# Patient Record
Sex: MALE | Race: WHITE | HISPANIC OR LATINO | Employment: FULL TIME | URBAN - METROPOLITAN AREA
[De-identification: names, ages, dates, MRNs, and addresses within clinical notes are randomized per-mention and may not be internally consistent; named-entity substitution may affect disease eponyms.]

---

## 2019-02-20 ENCOUNTER — HOSPITAL ENCOUNTER (EMERGENCY)
Facility: HOSPITAL | Age: 24
Discharge: HOME | End: 2019-02-20
Attending: EMERGENCY MEDICINE
Payer: MEDICAID

## 2019-02-20 VITALS
WEIGHT: 215 LBS | HEIGHT: 71 IN | SYSTOLIC BLOOD PRESSURE: 118 MMHG | RESPIRATION RATE: 17 BRPM | TEMPERATURE: 99 F | OXYGEN SATURATION: 100 % | BODY MASS INDEX: 30.1 KG/M2 | HEART RATE: 83 BPM | DIASTOLIC BLOOD PRESSURE: 67 MMHG

## 2019-02-20 DIAGNOSIS — J06.9 VIRAL URI WITH COUGH: Primary | ICD-10-CM

## 2019-02-20 LAB
INFLUENZA A, MOLECULAR: NEGATIVE
INFLUENZA B, MOLECULAR: NEGATIVE
SPECIMEN SOURCE: NORMAL

## 2019-02-20 PROCEDURE — 87502 INFLUENZA DNA AMP PROBE: CPT

## 2019-02-20 PROCEDURE — 25000003 PHARM REV CODE 250: Performed by: NURSE PRACTITIONER

## 2019-02-20 PROCEDURE — 99284 EMERGENCY DEPT VISIT MOD MDM: CPT

## 2019-02-20 RX ORDER — IBUPROFEN 600 MG/1
600 TABLET ORAL
Status: COMPLETED | OUTPATIENT
Start: 2019-02-20 | End: 2019-02-20

## 2019-02-20 RX ORDER — CETIRIZINE HYDROCHLORIDE 10 MG/1
10 TABLET ORAL DAILY
Qty: 30 TABLET | Refills: 0 | COMMUNITY
Start: 2019-02-20 | End: 2019-05-01

## 2019-02-20 RX ORDER — FLUTICASONE PROPIONATE 50 MCG
1 SPRAY, SUSPENSION (ML) NASAL 2 TIMES DAILY PRN
Qty: 15 G | Refills: 0 | Status: SHIPPED | OUTPATIENT
Start: 2019-02-20 | End: 2020-03-02

## 2019-02-20 RX ADMIN — IBUPROFEN 600 MG: 600 TABLET, FILM COATED ORAL at 08:02

## 2019-02-21 NOTE — ED PROVIDER NOTES
Encounter Date: 2/20/2019       History     Chief Complaint   Patient presents with    Generalized Body Aches     body pain, chills, cough, HA, congestion, SOB.      24-year-old male with presents with body aches and chills that began last night.  Patient is also complaining of congestion.  Patient took Mucinex for his congestion but did not take any Motrin or Tylenol for his body aches or chills.      The history is provided by the patient.     Review of patient's allergies indicates:  No Known Allergies  History reviewed. No pertinent past medical history.  History reviewed. No pertinent surgical history.  History reviewed. No pertinent family history.  Social History     Tobacco Use    Smoking status: Never Smoker   Substance Use Topics    Alcohol use: No     Frequency: Never     Comment: social    Drug use: No     Review of Systems   Constitutional: Positive for chills. Negative for fever.   HENT: Positive for congestion. Negative for sore throat.    Respiratory: Negative for cough and shortness of breath.    Cardiovascular: Negative for chest pain.   Gastrointestinal: Negative for nausea.   Genitourinary: Negative for dysuria.   Musculoskeletal: Positive for myalgias. Negative for back pain.   Skin: Negative for rash.   Neurological: Negative for weakness.   Hematological: Does not bruise/bleed easily.   All other systems reviewed and are negative.    Physical Exam     Initial Vitals [02/20/19 1846]   BP Pulse Resp Temp SpO2   (!) 184/79 108 17 99.6 °F (37.6 °C) 98 %      MAP       --         Physical Exam    Nursing note and vitals reviewed.  Constitutional: He appears well-developed and well-nourished. He is cooperative.  Non-toxic appearance.   HENT:   Head: Normocephalic and atraumatic.   Right Ear: Hearing, external ear and ear canal normal. Tympanic membrane is injected. Tympanic membrane is not erythematous and not retracted. A middle ear effusion is present.   Left Ear: Hearing, external ear and ear  canal normal. Tympanic membrane is injected. Tympanic membrane is not erythematous, not retracted and not bulging. A middle ear effusion is present.   Nose: Mucosal edema present. Right sinus exhibits no maxillary sinus tenderness and no frontal sinus tenderness. Left sinus exhibits no maxillary sinus tenderness and no frontal sinus tenderness.   Mouth/Throat: Uvula is midline, oropharynx is clear and moist and mucous membranes are normal.   Cobblestone appearance to posterior oropharynx and swollen turbinates to bilateral nares   Eyes: Conjunctivae and EOM are normal. Pupils are equal, round, and reactive to light.   Neck: Normal range of motion.   Cardiovascular: Normal rate, regular rhythm, S1 normal, S2 normal, normal heart sounds, intact distal pulses and normal pulses. Exam reveals no gallop and no friction rub.    No murmur heard.  Pulmonary/Chest: Breath sounds normal. No stridor. No respiratory distress. He has no wheezes. He has no rhonchi. He has no rales. He exhibits no tenderness.   Abdominal: Soft. Normal appearance and bowel sounds are normal. He exhibits no distension and no mass. There is no tenderness. There is no rebound and no guarding.   Musculoskeletal: Normal range of motion.   Lymphadenopathy:     He has no cervical adenopathy.   Neurological: He is alert.       ED Course   Procedures  Labs Reviewed   INFLUENZA A & B BY MOLECULAR           Medical Decision Making:   Initial Assessment:   24-year-old male with presents with body aches and chills that began last night.  Patient is also complaining of congestion.  Patient took Mucinex for his congestion but did not take any Motrin or Tylenol for his body aches or chills. Influenza ordered    Differential Diagnosis:   Influenza, viral URI, sinusitis  Clinical Tests:   Lab Tests: Ordered and Reviewed       <> Summary of Lab: Influenza negative  ED Management:  Patient examined and noted to have an exam consistent with a viral URI.  Influenza  negative.  Patient advised to use Flonase and Zyrtec at discharge. Patient given strict return precautions and voiced understanding of all discharge instructions.  Patient stable discharge.                   ED Course as of Feb 21 0048 Wed Feb 20, 2019 1901 BP: (!) 184/79 [AT]   1901 Temp: 99.6 °F (37.6 °C) [AT]   1901 Temp src: Oral [AT]   1901 Pulse: 108 [AT]   1901 Resp: 17 [AT]   1901 SpO2: 98 % [AT]   2012 Influenza A, Molecular: Negative [AT]   2012 Influenza B, Molecular: Negative [AT]      ED Course User Index  [AT] WILL Newman     Clinical Impression:       ICD-10-CM ICD-9-CM   1. Viral URI with cough J06.9 465.9    B97.89                                 WILL Newman  02/21/19 0051

## 2019-02-21 NOTE — ED NOTES
"Patient states he woke up this morning " with a headache, pressure behind my eyes, nose stopped up, and my body just hurts." Patient states while at work last he was also having episodes of being really hot and then really cold. Patient denies n/v but states he has been having intermittent episodes of diarrhea for two days.   "

## 2019-05-01 ENCOUNTER — HOSPITAL ENCOUNTER (EMERGENCY)
Facility: HOSPITAL | Age: 24
Discharge: HOME | End: 2019-05-01
Attending: EMERGENCY MEDICINE
Payer: MEDICAID

## 2019-05-01 VITALS
TEMPERATURE: 98 F | RESPIRATION RATE: 18 BRPM | WEIGHT: 217 LBS | OXYGEN SATURATION: 100 % | HEART RATE: 73 BPM | HEIGHT: 71 IN | SYSTOLIC BLOOD PRESSURE: 134 MMHG | DIASTOLIC BLOOD PRESSURE: 78 MMHG | BODY MASS INDEX: 30.38 KG/M2

## 2019-05-01 DIAGNOSIS — R05.9 COUGH: ICD-10-CM

## 2019-05-01 DIAGNOSIS — J40 BRONCHITIS: Primary | ICD-10-CM

## 2019-05-01 PROCEDURE — 99283 EMERGENCY DEPT VISIT LOW MDM: CPT | Mod: 25

## 2019-05-01 PROCEDURE — 25000003 PHARM REV CODE 250: Performed by: NURSE PRACTITIONER

## 2019-05-01 RX ORDER — IBUPROFEN 600 MG/1
600 TABLET ORAL
Status: COMPLETED | OUTPATIENT
Start: 2019-05-01 | End: 2019-05-01

## 2019-05-01 RX ORDER — BENZONATATE 100 MG/1
100 CAPSULE ORAL 3 TIMES DAILY PRN
Qty: 20 CAPSULE | Refills: 0 | Status: SHIPPED | OUTPATIENT
Start: 2019-05-01 | End: 2019-05-11

## 2019-05-01 RX ORDER — CETIRIZINE HYDROCHLORIDE 10 MG/1
10 TABLET ORAL DAILY
Qty: 30 TABLET | Refills: 0 | Status: SHIPPED | OUTPATIENT
Start: 2019-05-01 | End: 2020-03-02

## 2019-05-01 RX ADMIN — IBUPROFEN 600 MG: 600 TABLET ORAL at 08:05

## 2019-05-02 NOTE — DISCHARGE INSTRUCTIONS
Increase your fluid intake.  Use tylenol and motrin as directed on the labeling to help with fever/discomfort.

## 2019-05-02 NOTE — ED PROVIDER NOTES
Encounter Date: 5/1/2019       History     Chief Complaint   Patient presents with    Cough     Pt. c/o sore throat and productive cough with chest congestion for two days.     24-year-old male presents the ED for sore throat and productive cough for 2 days.  He reports subjective fever.  He denies chest pain or shortness of breath.  He has tried Flonase for symptoms without improvement.  Nothing makes symptoms worse or better.  He reports that he previously smoked cigarettes, but quit over a month ago.  He denies history of asthma and pneumonia.  He has been tolerating oral fluids without difficulty.  No known sick contacts.  No other complaints at this time.    The history is provided by the patient.     Review of patient's allergies indicates:  No Known Allergies  History reviewed. No pertinent past medical history.  History reviewed. No pertinent surgical history.  History reviewed. No pertinent family history.  Social History     Tobacco Use    Smoking status: Never Smoker   Substance Use Topics    Alcohol use: No     Frequency: Never     Comment: social    Drug use: No     Review of Systems   Constitutional: Positive for activity change, fatigue and fever. Negative for chills.   HENT: Positive for congestion, postnasal drip, rhinorrhea, sore throat and voice change (Hoarse voice). Negative for ear pain and trouble swallowing.    Respiratory: Positive for cough. Negative for shortness of breath.    Cardiovascular: Negative for chest pain.   Gastrointestinal: Negative for abdominal pain, diarrhea, nausea and vomiting.   Musculoskeletal: Negative for back pain and neck pain.   Skin: Negative for rash.   Neurological: Negative for weakness and headaches.   Psychiatric/Behavioral: Negative for confusion.       Physical Exam     Initial Vitals [05/01/19 2030]   BP Pulse Resp Temp SpO2   (!) 158/78 73 18 98.3 °F (36.8 °C) 98 %      MAP       --         Physical Exam    Nursing note and vitals  reviewed.  Constitutional: He appears well-developed and well-nourished. He is active and cooperative. He is easily aroused.  Non-toxic appearance. He does not have a sickly appearance. He does not appear ill. No distress.   HENT:   Head: Normocephalic and atraumatic.   Right Ear: External ear normal.   Left Ear: External ear normal.   Nose: Mucosal edema and rhinorrhea present. No sinus tenderness.  No foreign bodies. Right sinus exhibits no maxillary sinus tenderness and no frontal sinus tenderness. Left sinus exhibits no maxillary sinus tenderness and no frontal sinus tenderness.   Mouth/Throat: Uvula is midline and mucous membranes are normal. Posterior oropharyngeal erythema present. No oropharyngeal exudate, posterior oropharyngeal edema or tonsillar abscesses.   PND.  No PTA.  Hoarse but not muffled voice.  Managing secretions without difficulty.     Eyes: Conjunctivae are normal.   Neck: Trachea normal, normal range of motion and full passive range of motion without pain. Neck supple. No stridor present.   Cardiovascular: Normal rate, regular rhythm and normal heart sounds.   Pulmonary/Chest: Effort normal and breath sounds normal. He has no wheezes.   Abdominal: Soft. Normal appearance and bowel sounds are normal. There is no tenderness.   Lymphadenopathy:     He has no cervical adenopathy.   Neurological: He is alert, oriented to person, place, and time and easily aroused. GCS eye subscore is 4. GCS verbal subscore is 5. GCS motor subscore is 6.   Skin: Skin is warm, dry and intact. No bruising and no rash noted. No erythema.   Psychiatric: He has a normal mood and affect. His speech is normal and behavior is normal. Judgment and thought content normal. Cognition and memory are normal.         ED Course   Procedures  Labs Reviewed - No data to display       Imaging Results          X-Ray Chest PA And Lateral (Final result)  Result time 05/01/19 21:01:39    Final result by Juliano Abdul MD (05/01/19  21:01:39)                 Impression:      1. No acute cardiopulmonary process.      Electronically signed by: Juliano Abdul MD  Date:    05/01/2019  Time:    21:01             Narrative:    EXAMINATION:  XR CHEST PA AND LATERAL    CLINICAL HISTORY:  Cough    TECHNIQUE:  PA and lateral views of the chest were performed.    COMPARISON:  None    FINDINGS:  The cardiomediastinal silhouette is not enlarged.  There is no pleural effusion.  The trachea is midline.  The lungs are symmetrically expanded bilaterally without evidence of acute parenchymal process. No large focal consolidation seen.  There is no pneumothorax.  The osseous structures are unremarkable.                                 Medical Decision Making:   Initial Assessment:   24-year-old male here for nasal congestion, sore throat, and cough for 2 days.  No chest pain or shortness of breath. The patient appears well, nontoxic.  Vitals stable. No respiratory distress.  Differential Diagnosis:   Bronchitis, URI, allergies, irritants, pulmonary edema, GERD, laryngitis, tracheitis, asthma, sinusitis, pneumonia, viral, COPD, medication side effect    Clinical Tests:   Radiological Study: Ordered and Reviewed  ED Management:  CXR, motrin  This is a 24 y.o. male  who presents to the ED today with cough, congestion, symptoms consistent with viral bronchitis.  No indication for abx at this time.  Encouraged increased fluid intake. [facility] workup today does not show any sign of pneumonia on CXR. Likely this is a viral course that will need to run its course.  The patient feels back to baseline and is ready to go home. There is no need for emergent intervention at this time. I will discharge home with symptom control.  Pt reports that he feels better after ED interventions.  The patient is comfortable with this plan. After taking into careful account the historical factors and physical exam findings of the patient's presentation today, in conjunction with the  empirical and objective data obtained on ED workup, no acute emergent medical condition has been identified. The patient appears to be low risk for an emergent medical condition and I feel it is safe and appropriate at this time for the patient to be discharged to follow-up as detailed in their discharge instructions for reevaluation and possible continued outpatient workup and management. Discharge and follow-up instructions discussed with the patient who expressed understanding and willingness to comply with my recommendations.  RX zyrtec and tessalon  This record utilized Dragon voice recognition system.    Past medical records reviewed.                         Clinical Impression:       ICD-10-CM ICD-9-CM   1. Bronchitis J40 490   2. Cough R05 786.2                                Vilma Boss, WILL  05/01/19 2107

## 2019-07-23 ENCOUNTER — HOSPITAL ENCOUNTER (EMERGENCY)
Facility: OTHER | Age: 24
Discharge: HOME | End: 2019-07-23
Attending: EMERGENCY MEDICINE
Payer: MEDICAID

## 2019-07-23 VITALS
HEIGHT: 71 IN | DIASTOLIC BLOOD PRESSURE: 79 MMHG | RESPIRATION RATE: 18 BRPM | OXYGEN SATURATION: 99 % | WEIGHT: 200 LBS | HEART RATE: 68 BPM | BODY MASS INDEX: 28 KG/M2 | TEMPERATURE: 98 F | SYSTOLIC BLOOD PRESSURE: 135 MMHG

## 2019-07-23 DIAGNOSIS — R10.13 EPIGASTRIC PAIN: Primary | ICD-10-CM

## 2019-07-23 DIAGNOSIS — R91.1 LUNG NODULE: ICD-10-CM

## 2019-07-23 LAB
ALBUMIN SERPL BCP-MCNC: 4.6 G/DL (ref 3.5–5.2)
ALP SERPL-CCNC: 65 U/L (ref 55–135)
ALT SERPL W/O P-5'-P-CCNC: 23 U/L (ref 10–44)
AMPHET+METHAMPHET UR QL: NEGATIVE
ANION GAP SERPL CALC-SCNC: 10 MMOL/L (ref 8–16)
AST SERPL-CCNC: 26 U/L (ref 10–40)
BARBITURATES UR QL SCN>200 NG/ML: NEGATIVE
BASOPHILS # BLD AUTO: 0.05 K/UL (ref 0–0.2)
BASOPHILS NFR BLD: 0.6 % (ref 0–1.9)
BENZODIAZ UR QL SCN>200 NG/ML: NEGATIVE
BILIRUB SERPL-MCNC: 0.3 MG/DL (ref 0.1–1)
BILIRUB UR QL STRIP: NEGATIVE
BUN SERPL-MCNC: 15 MG/DL (ref 6–20)
BZE UR QL SCN: NEGATIVE
CALCIUM SERPL-MCNC: 9.6 MG/DL (ref 8.7–10.5)
CANNABINOIDS UR QL SCN: NEGATIVE
CHLORIDE SERPL-SCNC: 104 MMOL/L (ref 95–110)
CLARITY UR: CLEAR
CO2 SERPL-SCNC: 23 MMOL/L (ref 23–29)
COLOR UR: YELLOW
CREAT SERPL-MCNC: 1 MG/DL (ref 0.5–1.4)
CREAT UR-MCNC: 179.4 MG/DL (ref 23–375)
DIFFERENTIAL METHOD: ABNORMAL
EOSINOPHIL # BLD AUTO: 0.5 K/UL (ref 0–0.5)
EOSINOPHIL NFR BLD: 5.9 % (ref 0–8)
ERYTHROCYTE [DISTWIDTH] IN BLOOD BY AUTOMATED COUNT: 12.9 % (ref 11.5–14.5)
EST. GFR  (AFRICAN AMERICAN): >60 ML/MIN/1.73 M^2
EST. GFR  (NON AFRICAN AMERICAN): >60 ML/MIN/1.73 M^2
GLUCOSE SERPL-MCNC: 100 MG/DL (ref 70–110)
GLUCOSE UR QL STRIP: NEGATIVE
HCT VFR BLD AUTO: 46.8 % (ref 40–54)
HGB BLD-MCNC: 15.8 G/DL (ref 14–18)
HGB UR QL STRIP: NEGATIVE
IMM GRANULOCYTES # BLD AUTO: 0.03 K/UL (ref 0–0.04)
IMM GRANULOCYTES NFR BLD AUTO: 0.4 % (ref 0–0.5)
KETONES UR QL STRIP: NEGATIVE
LEUKOCYTE ESTERASE UR QL STRIP: NEGATIVE
LIPASE SERPL-CCNC: 21 U/L (ref 4–60)
LYMPHOCYTES # BLD AUTO: 2.7 K/UL (ref 1–4.8)
LYMPHOCYTES NFR BLD: 32.2 % (ref 18–48)
MCH RBC QN AUTO: 26.6 PG (ref 27–31)
MCHC RBC AUTO-ENTMCNC: 33.8 G/DL (ref 32–36)
MCV RBC AUTO: 79 FL (ref 82–98)
METHADONE UR QL SCN>300 NG/ML: NEGATIVE
MONOCYTES # BLD AUTO: 0.6 K/UL (ref 0.3–1)
MONOCYTES NFR BLD: 7.8 % (ref 4–15)
NEUTROPHILS # BLD AUTO: 4.4 K/UL (ref 1.8–7.7)
NEUTROPHILS NFR BLD: 53.1 % (ref 38–73)
NITRITE UR QL STRIP: NEGATIVE
NRBC BLD-RTO: 0 /100 WBC
OPIATES UR QL SCN: NEGATIVE
PCP UR QL SCN>25 NG/ML: NEGATIVE
PH UR STRIP: 6 [PH] (ref 5–8)
PLATELET # BLD AUTO: 272 K/UL (ref 150–350)
PMV BLD AUTO: 11.4 FL (ref 9.2–12.9)
POTASSIUM SERPL-SCNC: 4 MMOL/L (ref 3.5–5.1)
PROT SERPL-MCNC: 8.2 G/DL (ref 6–8.4)
PROT UR QL STRIP: NEGATIVE
RBC # BLD AUTO: 5.93 M/UL (ref 4.6–6.2)
SODIUM SERPL-SCNC: 137 MMOL/L (ref 136–145)
SP GR UR STRIP: 1.02 (ref 1–1.03)
TOXICOLOGY INFORMATION: NORMAL
URN SPEC COLLECT METH UR: NORMAL
UROBILINOGEN UR STRIP-ACNC: NEGATIVE EU/DL
WBC # BLD AUTO: 8.24 K/UL (ref 3.9–12.7)

## 2019-07-23 PROCEDURE — 25000003 PHARM REV CODE 250: Performed by: EMERGENCY MEDICINE

## 2019-07-23 PROCEDURE — 80053 COMPREHEN METABOLIC PANEL: CPT

## 2019-07-23 PROCEDURE — 96360 HYDRATION IV INFUSION INIT: CPT

## 2019-07-23 PROCEDURE — 83690 ASSAY OF LIPASE: CPT

## 2019-07-23 PROCEDURE — 81003 URINALYSIS AUTO W/O SCOPE: CPT | Mod: 59

## 2019-07-23 PROCEDURE — 99284 EMERGENCY DEPT VISIT MOD MDM: CPT | Mod: 25

## 2019-07-23 PROCEDURE — 80307 DRUG TEST PRSMV CHEM ANLYZR: CPT

## 2019-07-23 PROCEDURE — 85025 COMPLETE CBC W/AUTO DIFF WBC: CPT

## 2019-07-23 RX ADMIN — SODIUM CHLORIDE 1000 ML: 0.9 INJECTION, SOLUTION INTRAVENOUS at 06:07

## 2019-07-23 NOTE — ED NOTES
Pt to ED reporting LUQ ABD pain with nausea, denies vomiting. Pt AAOx4 and appropriate at this time. Respirations even and unlabored. No acute distress noted.

## 2019-07-23 NOTE — ED PROVIDER NOTES
"Encounter Date: 7/23/2019    SCRIBE #1 NOTE: I, Kimani Jono, am scribing for, and in the presence of, Dr. Ramirez.       History     Chief Complaint   Patient presents with    Abdominal Pain     Started this morning in the LUQ. He states that he feels nausea, nut has not thrown up. He describes the pain as pressure. Last BM this am, diarrhea     Time seen by provider: 5:53 PM    This is a 24 y.o. male who presents with complaint of abdominal pain that began this morning. He is also experiencing diarrhea. He reports having five episodes of loose stools today, which began before the abdominal pain. The pain is located to the upper abdomen and is described as pressure-like pain. He states that he also feels pressure on his right lower back. The pain is exacerbated with deep breaths.  He has had a similar episode of abdominal pain approximately one year ago and was evaluated at [facility]. He states that they found something on the CT scan, but when he had a repeat "disappeared and went away on its own". He denies fever, sore throat, chest pain, shortness of breath, nausea, constipation, and dysuria. He admits to socially drinking but denies smoking and illicit drug use.     The history is provided by the patient.     Review of patient's allergies indicates:  No Known Allergies  History reviewed. No pertinent past medical history.  History reviewed. No pertinent surgical history.  History reviewed. No pertinent family history.  Social History     Tobacco Use    Smoking status: Never Smoker    Smokeless tobacco: Never Used   Substance Use Topics    Alcohol use: No     Frequency: Never     Comment: social    Drug use: No     Review of Systems   Constitutional: Negative for fever.   HENT: Negative for sore throat.    Respiratory: Negative for shortness of breath.    Cardiovascular: Negative for chest pain.   Gastrointestinal: Positive for abdominal pain (upper) and diarrhea. Negative for constipation and nausea. "   Genitourinary: Negative for dysuria.   Musculoskeletal: Positive for back pain (right-sided lower).   Skin: Negative for rash.   Neurological: Negative for weakness.   Hematological: Does not bruise/bleed easily.       Physical Exam     Initial Vitals [07/23/19 1739]   BP Pulse Resp Temp SpO2   (!) 142/80 75 20 98.6 °F (37 °C) 100 %      MAP       --         Physical Exam    Nursing note and vitals reviewed.  Constitutional: He appears well-developed and well-nourished. He is not diaphoretic. No distress.   HENT:   Head: Normocephalic and atraumatic.   Mouth/Throat: Oropharynx is clear and moist.   Oropharynx is clear and intact. Moist mucous membranes.    Eyes: Conjunctivae and EOM are normal. Pupils are equal, round, and reactive to light.   Conjunctivae are clear, pink, and intact.    Neck: Normal range of motion.   Cardiovascular: Normal rate, regular rhythm and normal heart sounds. Exam reveals no gallop and no friction rub.    No murmur heard.  Pulmonary/Chest: Breath sounds normal. No respiratory distress. He has no wheezes. He has no rhonchi. He has no rales.   Lungs are clear to auscultation bilaterally.    Abdominal: Soft. He exhibits no mass. There is tenderness in the epigastric area. There is no rebound and no guarding. No hernia.   Mild epigastric tenderness. No RUQ or RLQ tenderness.   Musculoskeletal: Normal range of motion. He exhibits no edema or tenderness.   Neurological: He is alert and oriented to person, place, and time.   Skin: Skin is warm and dry. No rash and no abscess noted. No erythema. No pallor.   Psychiatric: He has a normal mood and affect. His behavior is normal. Judgment and thought content normal.         ED Course   Procedures  Labs Reviewed   CBC W/ AUTO DIFFERENTIAL - Abnormal; Notable for the following components:       Result Value    Mean Corpuscular Volume 79 (*)     Mean Corpuscular Hemoglobin 26.6 (*)     All other components within normal limits   COMPREHENSIVE  METABOLIC PANEL   LIPASE   URINALYSIS, REFLEX TO URINE CULTURE    Narrative:     Preferred Collection Type->Urine, Clean Catch   DRUG SCREEN PANEL, URINE EMERGENCY    Narrative:     Preferred Collection Type->Urine, Clean Catch          Imaging Results           CT Renal Stone Study ABD Pelvis WO (Final result)  Result time 07/23/19 20:19:47    Final result by Ibis Bruner MD (07/23/19 20:19:47)                 Impression:      No nephrolithiasis.  No obstructive uropathy.    5.6 mm right lower lobe pulmonary nodule.  Recommend correlation with social history and/or positive medical history.  Infectious or inflammatory etiologies also may be considered rather than neoplastic ones.  Consider follow-up.    This report was flagged in Epic as abnormal.      Electronically signed by: Ibis Bruner  Date:    07/23/2019  Time:    20:19             Narrative:    EXAMINATION:  CT ABDOMEN PELVIS WITHOUT    CLINICAL HISTORY:  Abdominal pain.    TECHNIQUE:  5 mm unenhanced axial images from the lung bases through the greater trochanters were performed.  Coronal and sagittal reformatted images were provided.    COMPARISON:  None.    FINDINGS:  Within the limits of a noncontrast examination, the liver, spleen, pancreas, kidneys, and adrenal glands are unremarkable.  The gallbladder contains no calcified gallstones.    There is no gross abdominal adenopathy or ascites.    There are no pelvic masses or adenopathy.  There is no free pelvic fluid.    At the lung bases, there is 5.6 mm nodule at the right lung base..                               X-Ray Abdomen Flat And Erect (Final result)  Result time 07/23/19 18:36:38    Final result by Juliano Abdul MD (07/23/19 18:36:38)                 Impression:      1. Findings may reflect constipation, no findings to suggest high-grade bowel obstruction.      Electronically signed by: Juliano Abdul MD  Date:    07/23/2019  Time:    18:36             Narrative:     EXAMINATION:  XR ABDOMEN FLAT AND ERECT    CLINICAL HISTORY:  Abdominal Pain;    TECHNIQUE:  Flat and erect AP views of the abdomen were performed.    COMPARISON:  None    FINDINGS:  Two upright views, 2 supine views.    Air and stool is seen within the large bowel and projected over the rectum.  There is moderate to large amount of stool in the colon.  There are a few air-fluid levels within nondilated small bowel loops on upright view, no significant bowel dilation on supine view.  There are no calcifications to suggest nephrolithiasis or cholelithiasis.  The lower lung zones are grossly clear.  No large volume free air or pneumatosis.                              X-Rays:   Independently Interpreted Readings:   Abdomen:   Flat and Erect of Abdomen - Nonspecific bowel gas.  No free air under diaphragm.  No air fluid levels or signs of obstruction.     Medical Decision Making:   Independently Interpreted Test(s):   I have ordered and independently interpreted X-rays - see prior notes.  Clinical Tests:   Lab Tests: Ordered and Reviewed  Radiological Study: Ordered and Reviewed            Scribe Attestation:   Scribe #1: I performed the above scribed service and the documentation accurately describes the services I performed. I attest to the accuracy of the note.    Attending Attestation:             Attending ED Notes:   Emergent evaluation a 24-year-old male with midepigastric abdominal pain. Patient is afebrile, nontoxic appearing with stable vital signs. No acute findings in urinary analysis.  No acute findings on drug screen.  Patient has no elevation of white blood cell count.  H&H within normal limits. No acute findings on CMP.  No acute findings on x-ray of the abdomen.  CT scan, renal stone study is without any acute findings.  Patient found to have a 5.6 mm right lower lobe pulmonary nodule.  The patient is extensively counseled on his diagnosis and treatment including all diagnostic, laboratory and physical  exam findings.  The patient discharged good condition and directed follow-up with his PCP in the next 24-48 hours.             Clinical Impression:     1. Epigastric pain    2. Lung nodule                                 Lul Ray MD  07/23/19 [id number]

## 2019-07-29 ENCOUNTER — HOSPITAL ENCOUNTER (EMERGENCY)
Facility: OTHER | Age: 24
Discharge: HOME | End: 2019-07-30
Attending: EMERGENCY MEDICINE
Payer: MEDICAID

## 2019-07-29 DIAGNOSIS — R10.10 PAIN OF UPPER ABDOMEN: Primary | ICD-10-CM

## 2019-07-29 DIAGNOSIS — R11.2 NON-INTRACTABLE VOMITING WITH NAUSEA, UNSPECIFIED VOMITING TYPE: ICD-10-CM

## 2019-07-29 PROCEDURE — 99284 EMERGENCY DEPT VISIT MOD MDM: CPT | Mod: 25

## 2019-07-29 PROCEDURE — 83690 ASSAY OF LIPASE: CPT

## 2019-07-29 PROCEDURE — 25000003 PHARM REV CODE 250: Performed by: PHYSICIAN ASSISTANT

## 2019-07-29 PROCEDURE — 96361 HYDRATE IV INFUSION ADD-ON: CPT

## 2019-07-29 PROCEDURE — 63600175 PHARM REV CODE 636 W HCPCS: Performed by: PHYSICIAN ASSISTANT

## 2019-07-29 PROCEDURE — 80053 COMPREHEN METABOLIC PANEL: CPT

## 2019-07-29 PROCEDURE — 36415 COLL VENOUS BLD VENIPUNCTURE: CPT

## 2019-07-29 PROCEDURE — 96374 THER/PROPH/DIAG INJ IV PUSH: CPT

## 2019-07-29 PROCEDURE — 85025 COMPLETE CBC W/AUTO DIFF WBC: CPT

## 2019-07-29 RX ORDER — KETOROLAC TROMETHAMINE 30 MG/ML
10 INJECTION, SOLUTION INTRAMUSCULAR; INTRAVENOUS
Status: COMPLETED | OUTPATIENT
Start: 2019-07-29 | End: 2019-07-29

## 2019-07-29 RX ADMIN — KETOROLAC TROMETHAMINE 10 MG: 30 INJECTION, SOLUTION INTRAMUSCULAR at 11:07

## 2019-07-29 RX ADMIN — SODIUM CHLORIDE 1000 ML: 0.9 INJECTION, SOLUTION INTRAVENOUS at 11:07

## 2019-07-29 RX ADMIN — LIDOCAINE HYDROCHLORIDE: 20 SOLUTION ORAL; TOPICAL at 11:07

## 2019-07-30 VITALS
WEIGHT: 201 LBS | BODY MASS INDEX: 28.14 KG/M2 | HEART RATE: 66 BPM | HEIGHT: 71 IN | OXYGEN SATURATION: 100 % | SYSTOLIC BLOOD PRESSURE: 121 MMHG | TEMPERATURE: 98 F | DIASTOLIC BLOOD PRESSURE: 59 MMHG | RESPIRATION RATE: 16 BRPM

## 2019-07-30 LAB
ALBUMIN SERPL BCP-MCNC: 4.8 G/DL (ref 3.5–5.2)
ALP SERPL-CCNC: 62 U/L (ref 55–135)
ALT SERPL W/O P-5'-P-CCNC: 21 U/L (ref 10–44)
ANION GAP SERPL CALC-SCNC: 10 MMOL/L (ref 8–16)
AST SERPL-CCNC: 24 U/L (ref 10–40)
BASOPHILS # BLD AUTO: 0.05 K/UL (ref 0–0.2)
BASOPHILS NFR BLD: 0.4 % (ref 0–1.9)
BILIRUB SERPL-MCNC: 0.4 MG/DL (ref 0.1–1)
BUN SERPL-MCNC: 20 MG/DL (ref 6–20)
CALCIUM SERPL-MCNC: 9.8 MG/DL (ref 8.7–10.5)
CHLORIDE SERPL-SCNC: 103 MMOL/L (ref 95–110)
CO2 SERPL-SCNC: 26 MMOL/L (ref 23–29)
CREAT SERPL-MCNC: 1.2 MG/DL (ref 0.5–1.4)
DIFFERENTIAL METHOD: ABNORMAL
EOSINOPHIL # BLD AUTO: 0.2 K/UL (ref 0–0.5)
EOSINOPHIL NFR BLD: 2 % (ref 0–8)
ERYTHROCYTE [DISTWIDTH] IN BLOOD BY AUTOMATED COUNT: 13.2 % (ref 11.5–14.5)
EST. GFR  (AFRICAN AMERICAN): >60 ML/MIN/1.73 M^2
EST. GFR  (NON AFRICAN AMERICAN): >60 ML/MIN/1.73 M^2
GLUCOSE SERPL-MCNC: 112 MG/DL (ref 70–110)
HCT VFR BLD AUTO: 46.7 % (ref 40–54)
HGB BLD-MCNC: 15.3 G/DL (ref 14–18)
IMM GRANULOCYTES # BLD AUTO: 0.03 K/UL (ref 0–0.04)
IMM GRANULOCYTES NFR BLD AUTO: 0.3 % (ref 0–0.5)
LIPASE SERPL-CCNC: 19 U/L (ref 4–60)
LYMPHOCYTES # BLD AUTO: 2.3 K/UL (ref 1–4.8)
LYMPHOCYTES NFR BLD: 19.8 % (ref 18–48)
MCH RBC QN AUTO: 26.5 PG (ref 27–31)
MCHC RBC AUTO-ENTMCNC: 32.8 G/DL (ref 32–36)
MCV RBC AUTO: 81 FL (ref 82–98)
MONOCYTES # BLD AUTO: 0.6 K/UL (ref 0.3–1)
MONOCYTES NFR BLD: 5.5 % (ref 4–15)
NEUTROPHILS # BLD AUTO: 8.3 K/UL (ref 1.8–7.7)
NEUTROPHILS NFR BLD: 72 % (ref 38–73)
NRBC BLD-RTO: 0 /100 WBC
PLATELET # BLD AUTO: 230 K/UL (ref 150–350)
PMV BLD AUTO: 11.5 FL (ref 9.2–12.9)
POTASSIUM SERPL-SCNC: 4.1 MMOL/L (ref 3.5–5.1)
PROT SERPL-MCNC: 8 G/DL (ref 6–8.4)
RBC # BLD AUTO: 5.78 M/UL (ref 4.6–6.2)
SODIUM SERPL-SCNC: 139 MMOL/L (ref 136–145)
WBC # BLD AUTO: 11.54 K/UL (ref 3.9–12.7)

## 2019-07-30 RX ORDER — ONDANSETRON 4 MG/1
4 TABLET, ORALLY DISINTEGRATING ORAL EVERY 6 HOURS PRN
Qty: 20 TABLET | Refills: 0 | Status: SHIPPED | OUTPATIENT
Start: 2019-07-30 | End: 2020-03-02

## 2019-07-30 RX ORDER — FAMOTIDINE 20 MG/1
20 TABLET, FILM COATED ORAL 2 TIMES DAILY
Qty: 20 TABLET | Refills: 0 | Status: SHIPPED | OUTPATIENT
Start: 2019-07-30 | End: 2020-03-02

## 2019-07-30 NOTE — ED NOTES
Report received from RASHAD Guy. Assumed care of patient at this time. Patient lying on stretcher with HOB elevated.  AAOx4 and appropriate at this time. Restroom and comfort needs addressed.  Pt updated on POC. RR even and unlabored, NAD noted. No acute distress noted.  SRx2 up, bed in lowest position, call light within reach.  Will continue to monitor.

## 2019-07-30 NOTE — ED TRIAGE NOTES
Pt presents to ED with c/o of epigastric and LUQ abdominal pain x4 days. Reports the pain feels like an intense cramping. States it is tender with palpation. States he was here Friday for the same reason and was told to come back if it was getting worse. Reports N/V but denies diarrhea and fever

## 2019-07-30 NOTE — ED PROVIDER NOTES
Encounter Date: 7/29/2019       History     Chief Complaint   Patient presents with    Abdominal Pain     abdominal cramping x 4 days, with vomiting today.      Patient is a 24-year-old male presenting to the emergency department for evaluation of abdominal pain, nausea and vomiting.  The patient states his symptoms started earlier this afternoon.  He states that he has had a cramping pain in his abdomen that worsens when he lays down or moves.  He does report 3 episodes of nonbloody emesis.  He denies any diarrhea or fevers.  He admits that he had similar symptoms a few days ago where he was seen in the emergency department.  He states that upon discharge, he was feeling much better.  He denies any prior episode of this besides a few days ago.  He denies any known sick contacts.  Normal bowel movement.  No medication use thus far.This is the extent of the patient's complaints at this time.       The history is provided by the patient.     Review of patient's allergies indicates:  No Known Allergies  History reviewed. No pertinent past medical history.  History reviewed. No pertinent surgical history.  History reviewed. No pertinent family history.  Social History     Tobacco Use    Smoking status: Never Smoker    Smokeless tobacco: Never Used   Substance Use Topics    Alcohol use: No     Frequency: Never     Comment: social    Drug use: No     Review of Systems   Constitutional: Negative for activity change, chills, fatigue and fever.   HENT: Negative for congestion, rhinorrhea and sore throat.    Eyes: Negative for photophobia and visual disturbance.   Respiratory: Negative for cough and shortness of breath.    Cardiovascular: Negative for chest pain.   Gastrointestinal: Positive for abdominal pain, nausea and vomiting. Negative for diarrhea.   Genitourinary: Negative for dysuria, hematuria and urgency.   Musculoskeletal: Negative for back pain, myalgias and neck pain.   Skin: Negative for color change and  wound.   Neurological: Negative for weakness and headaches.   Psychiatric/Behavioral: Negative for agitation and confusion.       Physical Exam     Initial Vitals [07/29/19 2231]   BP Pulse Resp Temp SpO2   138/89 72 16 98.4 °F (36.9 °C) 99 %      MAP       --         Physical Exam    Nursing note and vitals reviewed.  Constitutional: Vital signs are normal. He appears well-developed and well-nourished. He is not diaphoretic.  Non-toxic appearance. He does not have a sickly appearance. He does not appear ill. No distress.   Well appearing male accompanied by his significant other in the emergency department. He is speaking in clear and full sentences. No acute distress. Ambulates without difficulty.    HENT:   Head: Normocephalic and atraumatic.   Right Ear: External ear normal.   Left Ear: External ear normal.   Nose: Nose normal.   Mouth/Throat: Oropharynx is clear and moist.   Eyes: Conjunctivae and EOM are normal.   Neck: Normal range of motion. Neck supple.   Cardiovascular: Normal rate, regular rhythm and normal heart sounds.   Pulmonary/Chest: Breath sounds normal. No respiratory distress. He has no wheezes.   Abdominal: Soft. Bowel sounds are normal. He exhibits no distension. There is tenderness. There is no rebound and no guarding.   Tenderness to palpation of the upper abdomen most significant left upper quadrant.  No rebound guarding.   Musculoskeletal: Normal range of motion.   Neurological: He is alert and oriented to person, place, and time. GCS eye subscore is 4. GCS verbal subscore is 5. GCS motor subscore is 6.   Skin: Skin is warm.   Psychiatric: He has a normal mood and affect. His behavior is normal. Judgment and thought content normal.         ED Course   Procedures  Labs Reviewed   CBC W/ AUTO DIFFERENTIAL - Abnormal; Notable for the following components:       Result Value    Mean Corpuscular Volume 81 (*)     Mean Corpuscular Hemoglobin 26.5 (*)     Gran # (ANC) 8.3 (*)     All other  components within normal limits   COMPREHENSIVE METABOLIC PANEL - Abnormal; Notable for the following components:    Glucose 112 (*)     All other components within normal limits   LIPASE             Medical Decision Making:   History:   Old Medical Records: I decided to obtain old medical records.  Old Records Summarized: records from previous admission(s).       <> Summary of Records:     Reviewed patient's medical record in epic including ED visit from 07/13/2019.  Patient had relatively unremarkable labs well as CT scan.      Initial Assessment:     Urgent evaluation of a 24-year-old male presenting emergency department for evaluation of abdominal pain, nausea, vomiting. Patient is afebrile, nontoxic appearing, hemodynamically stable. Physical exam reveals tenderness to palpation of the upper abdomen with no rebound, guarding, mass. Will plan for labs, analgesics and reassess.    Clinical Tests:   Lab Tests: Ordered and Reviewed  ED Management:    CBC shows no leukocytosis, normal H&H.  CMP shows no significant electrolyte abnormality, normal BUN and creatinine.  Glucose is 112.  Lipase is normal.    12:29 AM Went to reassess the patient, he is sleeping comfortably in the stretcher. He states he is feeling much better. At this time, I do not feel that further testing imaging is warranted.  Patient's labs are relatively unremarkable, recently had normal imaging.  Will plan to discharge home with a prescription for Zofran and Pepcid.  Urged to obtain follow-up with PCP.  Discharged home in stable condition.The patient was instructed to follow up with a primary care provider in 2 days or to return to the emergency department for worsening symptoms. The treatment plan was discussed with the patient who demonstrated understanding and comfort with plan.    This note was created using [employer] Flu[employer] Direct. There may be typographical errors secondary to dictation.                         Clinical Impression:     1.  Pain of upper abdomen    2. Non-intractable vomiting with nausea, unspecified vomiting type           Disposition:   Disposition: Discharged  Condition: Stable                        Shantel Almanza PA-C  07/30/19 0032

## 2019-11-18 ENCOUNTER — HOSPITAL ENCOUNTER (OUTPATIENT)
Facility: HOSPITAL | Age: 24
LOS: 1 days | Discharge: HOME | End: 2019-11-20
Attending: EMERGENCY MEDICINE | Admitting: INTERNAL MEDICINE
Payer: MEDICAID

## 2019-11-18 DIAGNOSIS — K92.2 UPPER GI BLEED: ICD-10-CM

## 2019-11-18 DIAGNOSIS — R10.10 UPPER ABDOMINAL PAIN, UNSPECIFIED: ICD-10-CM

## 2019-11-18 DIAGNOSIS — R11.10 INTRACTABLE VOMITING, PRESENCE OF NAUSEA NOT SPECIFIED, UNSPECIFIED VOMITING TYPE: ICD-10-CM

## 2019-11-18 DIAGNOSIS — R10.13 EPIGASTRIC ABDOMINAL PAIN: Primary | ICD-10-CM

## 2019-11-18 DIAGNOSIS — R52 PAIN: ICD-10-CM

## 2019-11-18 LAB
ABO + RH BLD: NORMAL
ALBUMIN SERPL BCP-MCNC: 5.7 G/DL (ref 3.5–5.2)
ALP SERPL-CCNC: 55 U/L (ref 55–135)
ALT SERPL W/O P-5'-P-CCNC: 28 U/L (ref 10–44)
AMPHET+METHAMPHET UR QL: NEGATIVE
AMYLASE SERPL-CCNC: 53 U/L (ref 20–110)
AMYLASE SERPL-CCNC: 55 U/L (ref 20–110)
ANION GAP SERPL CALC-SCNC: 12 MMOL/L (ref 8–16)
AST SERPL-CCNC: 33 U/L (ref 10–40)
BACTERIA #/AREA URNS HPF: NEGATIVE /HPF
BARBITURATES UR QL SCN>200 NG/ML: NEGATIVE
BASOPHILS # BLD AUTO: 0.03 K/UL (ref 0–0.2)
BASOPHILS NFR BLD: 0.2 % (ref 0–1.9)
BENZODIAZ UR QL SCN>200 NG/ML: NEGATIVE
BILIRUB SERPL-MCNC: 0.7 MG/DL (ref 0.1–1)
BILIRUB UR QL STRIP: NEGATIVE
BLD GP AB SCN CELLS X3 SERPL QL: NORMAL
BUN SERPL-MCNC: 21 MG/DL (ref 6–20)
BZE UR QL SCN: NEGATIVE
CALCIUM SERPL-MCNC: 9.7 MG/DL (ref 8.7–10.5)
CANNABINOIDS UR QL SCN: NEGATIVE
CHLORIDE SERPL-SCNC: 100 MMOL/L (ref 95–110)
CK MB SERPL-MCNC: 6.2 NG/ML (ref 0.1–6.5)
CK SERPL-CCNC: 571 U/L (ref 20–200)
CLARITY UR: CLEAR
CO2 SERPL-SCNC: 26 MMOL/L (ref 23–29)
COLOR UR: YELLOW
CREAT SERPL-MCNC: 1.1 MG/DL (ref 0.5–1.4)
CREAT UR-MCNC: 355 MG/DL (ref 23–375)
DEPRECATED S PYO AG THROAT QL EIA: NEGATIVE
DIFFERENTIAL METHOD: ABNORMAL
EOSINOPHIL # BLD AUTO: 0.1 K/UL (ref 0–0.5)
EOSINOPHIL NFR BLD: 0.3 % (ref 0–8)
ERYTHROCYTE [DISTWIDTH] IN BLOOD BY AUTOMATED COUNT: 13.1 % (ref 11.5–14.5)
EST. GFR  (AFRICAN AMERICAN): >60 ML/MIN/1.73 M^2
EST. GFR  (NON AFRICAN AMERICAN): >60 ML/MIN/1.73 M^2
GLUCOSE SERPL-MCNC: 126 MG/DL (ref 70–110)
GLUCOSE UR QL STRIP: NEGATIVE
HCT VFR BLD AUTO: 51.3 % (ref 40–54)
HGB BLD-MCNC: 16.6 G/DL (ref 14–18)
HGB UR QL STRIP: NEGATIVE
HYALINE CASTS #/AREA URNS LPF: 25 /LPF
IMM GRANULOCYTES # BLD AUTO: 0.08 K/UL (ref 0–0.04)
IMM GRANULOCYTES NFR BLD AUTO: 0.4 % (ref 0–0.5)
INFLUENZA A, MOLECULAR: NEGATIVE
INFLUENZA B, MOLECULAR: NEGATIVE
KETONES UR QL STRIP: NEGATIVE
LEUKOCYTE ESTERASE UR QL STRIP: NEGATIVE
LIPASE SERPL-CCNC: 28 U/L (ref 4–60)
LYMPHOCYTES # BLD AUTO: 0.7 K/UL (ref 1–4.8)
LYMPHOCYTES NFR BLD: 3.6 % (ref 18–48)
MAGNESIUM SERPL-MCNC: 2 MG/DL (ref 1.6–2.6)
MCH RBC QN AUTO: 26.7 PG (ref 27–31)
MCHC RBC AUTO-ENTMCNC: 32.4 G/DL (ref 32–36)
MCV RBC AUTO: 83 FL (ref 82–98)
MICROSCOPIC COMMENT: ABNORMAL
MONOCYTES # BLD AUTO: 1 K/UL (ref 0.3–1)
MONOCYTES NFR BLD: 5.1 % (ref 4–15)
NEUTROPHILS # BLD AUTO: 17.4 K/UL (ref 1.8–7.7)
NEUTROPHILS NFR BLD: 90.4 % (ref 38–73)
NITRITE UR QL STRIP: NEGATIVE
NRBC BLD-RTO: 0 /100 WBC
OPIATES UR QL SCN: NEGATIVE
PCP UR QL SCN>25 NG/ML: NEGATIVE
PH UR STRIP: 7 [PH] (ref 5–8)
PLATELET # BLD AUTO: 237 K/UL (ref 150–350)
PMV BLD AUTO: 11.7 FL (ref 9.2–12.9)
POTASSIUM SERPL-SCNC: 3.9 MMOL/L (ref 3.5–5.1)
PROT SERPL-MCNC: 8.7 G/DL (ref 6–8.4)
PROT UR QL STRIP: ABNORMAL
RBC # BLD AUTO: 6.21 M/UL (ref 4.6–6.2)
RBC #/AREA URNS HPF: 2 /HPF (ref 0–4)
SODIUM SERPL-SCNC: 138 MMOL/L (ref 136–145)
SP GR UR STRIP: >1.03 (ref 1–1.03)
SPECIMEN SOURCE: NORMAL
SQUAMOUS #/AREA URNS HPF: 7 /HPF
TOXICOLOGY INFORMATION: NORMAL
TROPONIN I SERPL DL<=0.01 NG/ML-MCNC: <0.03 NG/ML (ref 0.02–0.04)
URN SPEC COLLECT METH UR: ABNORMAL
UROBILINOGEN UR STRIP-ACNC: NEGATIVE EU/DL
WBC # BLD AUTO: 19.28 K/UL (ref 3.9–12.7)
WBC #/AREA URNS HPF: 9 /HPF (ref 0–5)

## 2019-11-18 PROCEDURE — 82550 ASSAY OF CK (CPK): CPT

## 2019-11-18 PROCEDURE — 81001 URINALYSIS AUTO W/SCOPE: CPT

## 2019-11-18 PROCEDURE — 83690 ASSAY OF LIPASE: CPT

## 2019-11-18 PROCEDURE — 82150 ASSAY OF AMYLASE: CPT | Mod: 91

## 2019-11-18 PROCEDURE — 93005 ELECTROCARDIOGRAM TRACING: CPT

## 2019-11-18 PROCEDURE — 86901 BLOOD TYPING SEROLOGIC RH(D): CPT

## 2019-11-18 PROCEDURE — 96375 TX/PRO/DX INJ NEW DRUG ADDON: CPT | Mod: 59

## 2019-11-18 PROCEDURE — 87502 INFLUENZA DNA AMP PROBE: CPT

## 2019-11-18 PROCEDURE — 82150 ASSAY OF AMYLASE: CPT

## 2019-11-18 PROCEDURE — 25000003 PHARM REV CODE 250: Performed by: PHYSICIAN ASSISTANT

## 2019-11-18 PROCEDURE — 80053 COMPREHEN METABOLIC PANEL: CPT

## 2019-11-18 PROCEDURE — 83735 ASSAY OF MAGNESIUM: CPT

## 2019-11-18 PROCEDURE — 87081 CULTURE SCREEN ONLY: CPT

## 2019-11-18 PROCEDURE — 63600175 PHARM REV CODE 636 W HCPCS: Performed by: EMERGENCY MEDICINE

## 2019-11-18 PROCEDURE — 85025 COMPLETE CBC W/AUTO DIFF WBC: CPT

## 2019-11-18 PROCEDURE — 82553 CREATINE MB FRACTION: CPT

## 2019-11-18 PROCEDURE — 99285 EMERGENCY DEPT VISIT HI MDM: CPT | Mod: 25

## 2019-11-18 PROCEDURE — 80307 DRUG TEST PRSMV CHEM ANLYZR: CPT

## 2019-11-18 PROCEDURE — 36415 COLL VENOUS BLD VENIPUNCTURE: CPT

## 2019-11-18 PROCEDURE — 84484 ASSAY OF TROPONIN QUANT: CPT

## 2019-11-18 PROCEDURE — 96365 THER/PROPH/DIAG IV INF INIT: CPT | Mod: 59

## 2019-11-18 PROCEDURE — 12000002 HC ACUTE/MED SURGE SEMI-PRIVATE ROOM

## 2019-11-18 PROCEDURE — 96361 HYDRATE IV INFUSION ADD-ON: CPT

## 2019-11-18 PROCEDURE — 87040 BLOOD CULTURE FOR BACTERIA: CPT | Mod: 59

## 2019-11-18 PROCEDURE — 87880 STREP A ASSAY W/OPTIC: CPT

## 2019-11-18 PROCEDURE — 25500020 PHARM REV CODE 255: Performed by: EMERGENCY MEDICINE

## 2019-11-18 RX ORDER — FAMOTIDINE 10 MG/ML
20 INJECTION INTRAVENOUS
Status: COMPLETED | OUTPATIENT
Start: 2019-11-18 | End: 2019-11-19

## 2019-11-18 RX ORDER — ONDANSETRON 4 MG/1
4 TABLET, ORALLY DISINTEGRATING ORAL
Status: COMPLETED | OUTPATIENT
Start: 2019-11-18 | End: 2019-11-18

## 2019-11-18 RX ORDER — ONDANSETRON 2 MG/ML
4 INJECTION INTRAMUSCULAR; INTRAVENOUS ONCE
Status: COMPLETED | OUTPATIENT
Start: 2019-11-18 | End: 2019-11-18

## 2019-11-18 RX ADMIN — PIPERACILLIN AND TAZOBACTAM 4.5 G: 4; .5 INJECTION, POWDER, LYOPHILIZED, FOR SOLUTION INTRAVENOUS; PARENTERAL at 07:11

## 2019-11-18 RX ADMIN — ONDANSETRON 4 MG: 4 TABLET, ORALLY DISINTEGRATING ORAL at 05:11

## 2019-11-18 RX ADMIN — SODIUM CHLORIDE 1000 ML: 0.9 INJECTION, SOLUTION INTRAVENOUS at 07:11

## 2019-11-18 RX ADMIN — SODIUM CHLORIDE 1000 ML: 0.9 INJECTION, SOLUTION INTRAVENOUS at 11:11

## 2019-11-18 RX ADMIN — IOHEXOL 100 ML: 350 INJECTION, SOLUTION INTRAVENOUS at 08:11

## 2019-11-18 RX ADMIN — ONDANSETRON 4 MG: 2 INJECTION INTRAMUSCULAR; INTRAVENOUS at 07:11

## 2019-11-19 PROBLEM — R10.13 EPIGASTRIC ABDOMINAL PAIN: Status: ACTIVE | Noted: 2019-11-19

## 2019-11-19 LAB
ANION GAP SERPL CALC-SCNC: 11 MMOL/L (ref 8–16)
BASOPHILS # BLD AUTO: 0.02 K/UL (ref 0–0.2)
BASOPHILS NFR BLD: 0.2 % (ref 0–1.9)
BUN SERPL-MCNC: 24 MG/DL (ref 6–20)
CALCIUM SERPL-MCNC: 8.4 MG/DL (ref 8.7–10.5)
CHLORIDE SERPL-SCNC: 105 MMOL/L (ref 95–110)
CO2 SERPL-SCNC: 24 MMOL/L (ref 23–29)
CREAT SERPL-MCNC: 1.1 MG/DL (ref 0.5–1.4)
DIFFERENTIAL METHOD: ABNORMAL
EOSINOPHIL # BLD AUTO: 0 K/UL (ref 0–0.5)
EOSINOPHIL NFR BLD: 0.1 % (ref 0–8)
ERYTHROCYTE [DISTWIDTH] IN BLOOD BY AUTOMATED COUNT: 13.2 % (ref 11.5–14.5)
EST. GFR  (AFRICAN AMERICAN): >60 ML/MIN/1.73 M^2
EST. GFR  (NON AFRICAN AMERICAN): >60 ML/MIN/1.73 M^2
GLUCOSE SERPL-MCNC: 116 MG/DL (ref 70–110)
HCT VFR BLD AUTO: 45 % (ref 40–54)
HGB BLD-MCNC: 14.7 G/DL (ref 14–18)
IMM GRANULOCYTES # BLD AUTO: 0.03 K/UL (ref 0–0.04)
IMM GRANULOCYTES NFR BLD AUTO: 0.3 % (ref 0–0.5)
LYMPHOCYTES # BLD AUTO: 0.9 K/UL (ref 1–4.8)
LYMPHOCYTES NFR BLD: 10.4 % (ref 18–48)
MCH RBC QN AUTO: 26.9 PG (ref 27–31)
MCHC RBC AUTO-ENTMCNC: 32.7 G/DL (ref 32–36)
MCV RBC AUTO: 82 FL (ref 82–98)
MONOCYTES # BLD AUTO: 0.6 K/UL (ref 0.3–1)
MONOCYTES NFR BLD: 6.2 % (ref 4–15)
NEUTROPHILS # BLD AUTO: 7.3 K/UL (ref 1.8–7.7)
NEUTROPHILS NFR BLD: 82.8 % (ref 38–73)
NRBC BLD-RTO: 0 /100 WBC
OB PNL STL: POSITIVE
PLATELET # BLD AUTO: 202 K/UL (ref 150–350)
PMV BLD AUTO: 11.8 FL (ref 9.2–12.9)
POTASSIUM SERPL-SCNC: 3.7 MMOL/L (ref 3.5–5.1)
RBC # BLD AUTO: 5.47 M/UL (ref 4.6–6.2)
SODIUM SERPL-SCNC: 140 MMOL/L (ref 136–145)
WBC # BLD AUTO: 8.86 K/UL (ref 3.9–12.7)

## 2019-11-19 PROCEDURE — 63600175 PHARM REV CODE 636 W HCPCS: Performed by: INTERNAL MEDICINE

## 2019-11-19 PROCEDURE — G0378 HOSPITAL OBSERVATION PER HR: HCPCS

## 2019-11-19 PROCEDURE — 85025 COMPLETE CBC W/AUTO DIFF WBC: CPT

## 2019-11-19 PROCEDURE — 99152 MOD SED SAME PHYS/QHP 5/>YRS: CPT | Mod: 59 | Performed by: INTERNAL MEDICINE

## 2019-11-19 PROCEDURE — 94761 N-INVAS EAR/PLS OXIMETRY MLT: CPT

## 2019-11-19 PROCEDURE — S0028 INJECTION, FAMOTIDINE, 20 MG: HCPCS | Performed by: EMERGENCY MEDICINE

## 2019-11-19 PROCEDURE — 25000003 PHARM REV CODE 250: Performed by: EMERGENCY MEDICINE

## 2019-11-19 PROCEDURE — C9113 INJ PANTOPRAZOLE SODIUM, VIA: HCPCS | Performed by: INTERNAL MEDICINE

## 2019-11-19 PROCEDURE — 99153 MOD SED SAME PHYS/QHP EA: CPT | Performed by: INTERNAL MEDICINE

## 2019-11-19 PROCEDURE — 27201038 HC PROBE, BI-POLAR: Performed by: INTERNAL MEDICINE

## 2019-11-19 PROCEDURE — 87046 STOOL CULTR AEROBIC BACT EA: CPT

## 2019-11-19 PROCEDURE — 36415 COLL VENOUS BLD VENIPUNCTURE: CPT

## 2019-11-19 PROCEDURE — 43270 EGD LESION ABLATION: CPT | Performed by: INTERNAL MEDICINE

## 2019-11-19 PROCEDURE — 96361 HYDRATE IV INFUSION ADD-ON: CPT

## 2019-11-19 PROCEDURE — 27200043 HC FORCEPS, BIOPSY: Performed by: INTERNAL MEDICINE

## 2019-11-19 PROCEDURE — 96375 TX/PRO/DX INJ NEW DRUG ADDON: CPT | Mod: 59

## 2019-11-19 PROCEDURE — 87209 SMEAR COMPLEX STAIN: CPT

## 2019-11-19 PROCEDURE — 80048 BASIC METABOLIC PNL TOTAL CA: CPT

## 2019-11-19 PROCEDURE — 43239 EGD BIOPSY SINGLE/MULTIPLE: CPT | Performed by: INTERNAL MEDICINE

## 2019-11-19 PROCEDURE — 87045 FECES CULTURE AEROBIC BACT: CPT

## 2019-11-19 PROCEDURE — 82272 OCCULT BLD FECES 1-3 TESTS: CPT

## 2019-11-19 PROCEDURE — 87015 SPECIMEN INFECT AGNT CONCNTJ: CPT | Mod: 59

## 2019-11-19 RX ORDER — MIDAZOLAM HYDROCHLORIDE 1 MG/ML
INJECTION INTRAMUSCULAR; INTRAVENOUS
Status: COMPLETED | OUTPATIENT
Start: 2019-11-19 | End: 2019-11-19

## 2019-11-19 RX ORDER — SODIUM CHLORIDE 9 MG/ML
INJECTION, SOLUTION INTRAVENOUS CONTINUOUS
Status: DISCONTINUED | OUTPATIENT
Start: 2019-11-19 | End: 2019-11-20 | Stop reason: HOSPADM

## 2019-11-19 RX ORDER — PANTOPRAZOLE SODIUM 40 MG/10ML
40 INJECTION, POWDER, LYOPHILIZED, FOR SOLUTION INTRAVENOUS DAILY
Status: DISCONTINUED | OUTPATIENT
Start: 2019-11-19 | End: 2019-11-20 | Stop reason: HOSPADM

## 2019-11-19 RX ORDER — FENTANYL CITRATE 50 UG/ML
INJECTION, SOLUTION INTRAMUSCULAR; INTRAVENOUS
Status: COMPLETED | OUTPATIENT
Start: 2019-11-19 | End: 2019-11-19

## 2019-11-19 RX ORDER — DIAZEPAM 10 MG/2ML
INJECTION INTRAMUSCULAR
Status: COMPLETED | OUTPATIENT
Start: 2019-11-19 | End: 2019-11-19

## 2019-11-19 RX ADMIN — MIDAZOLAM HYDROCHLORIDE 2 MG: 1 INJECTION, SOLUTION INTRAMUSCULAR; INTRAVENOUS at 03:11

## 2019-11-19 RX ADMIN — FENTANYL CITRATE 25 MCG: 50 INJECTION, SOLUTION INTRAMUSCULAR; INTRAVENOUS at 03:11

## 2019-11-19 RX ADMIN — FENTANYL CITRATE 50 MCG: 50 INJECTION, SOLUTION INTRAMUSCULAR; INTRAVENOUS at 03:11

## 2019-11-19 RX ADMIN — SODIUM CHLORIDE: 0.9 INJECTION, SOLUTION INTRAVENOUS at 01:11

## 2019-11-19 RX ADMIN — SODIUM CHLORIDE: 0.9 INJECTION, SOLUTION INTRAVENOUS at 10:11

## 2019-11-19 RX ADMIN — DIAZEPAM 5 MG: 5 INJECTION, SOLUTION INTRAMUSCULAR; INTRAVENOUS at 03:11

## 2019-11-19 RX ADMIN — PANTOPRAZOLE SODIUM 40 MG: 40 INJECTION, POWDER, LYOPHILIZED, FOR SOLUTION INTRAVENOUS at 10:11

## 2019-11-19 RX ADMIN — FAMOTIDINE 20 MG: 10 INJECTION INTRAVENOUS at 12:11

## 2019-11-19 RX ADMIN — MIDAZOLAM HYDROCHLORIDE 1 MG: 1 INJECTION, SOLUTION INTRAMUSCULAR; INTRAVENOUS at 03:11

## 2019-11-19 NOTE — ED PROVIDER NOTES
Encounter Date: 11/18/2019       History     Chief Complaint   Patient presents with    Abdominal Pain     This is a 24-year-old male who presents complaining of body aches chills abdominal pain and multiple episodes of nausea and vomiting since this morning.  Patient has not had diarrhea.  He reports generalized malaise and fatigue.  Emesis has been nonbilious with no visualized blood.  He reports diffuse crampy abdominal pain. He woke up feeling well and symptoms started several hours prior to presentation.  Pain is mostly located in the epigastric area.  The abdominal pain is worse with vomiting. He also reports some lower anterior chest pain. He reports a slight cough but denies sore throat sinus congestion or flu-like symptoms. He denies dysuria frequency urgency or any penile or testicular pain or discomfort.  He denies any other problems or complaints.        Review of patient's allergies indicates:  No Known Allergies  No past medical history on file.  No past surgical history on file.  No family history on file.  Social History     Tobacco Use    Smoking status: Never Smoker    Smokeless tobacco: Never Used   Substance Use Topics    Alcohol use: No     Frequency: Never     Comment: social    Drug use: No     Review of Systems   Constitutional: Positive for appetite change, chills and fever. Negative for activity change, diaphoresis, fatigue and unexpected weight change.   HENT: Negative.  Negative for congestion, dental problem, ear pain, nosebleeds, postnasal drip, rhinorrhea, sinus pressure, sinus pain, sore throat, tinnitus, trouble swallowing and voice change.    Eyes: Negative.  Negative for pain and visual disturbance.   Respiratory: Negative.  Negative for cough, chest tightness, shortness of breath and wheezing.    Cardiovascular: Positive for chest pain. Negative for palpitations and leg swelling.   Gastrointestinal: Positive for abdominal pain, nausea and vomiting. Negative for abdominal  distention, anal bleeding, blood in stool, constipation, diarrhea and rectal pain.   Endocrine: Negative.    Genitourinary: Negative.  Negative for difficulty urinating, discharge, dysuria, flank pain, frequency, genital sores, penile pain, penile swelling, scrotal swelling, testicular pain and urgency.   Musculoskeletal: Negative.  Negative for arthralgias, back pain, gait problem, joint swelling, myalgias, neck pain and neck stiffness.   Skin: Negative.  Negative for color change, pallor and rash.   Neurological: Negative.  Negative for dizziness, tremors, seizures, syncope, facial asymmetry, speech difficulty, weakness, light-headedness, numbness and headaches.   Hematological: Negative.  Does not bruise/bleed easily.   Psychiatric/Behavioral: Negative.  Negative for confusion.   All other systems reviewed and are negative.      Physical Exam     Initial Vitals [11/18/19 1513]   BP Pulse Resp Temp SpO2   (!) 156/71 91 20 97.9 °F (36.6 °C) 100 %      MAP       --         Physical Exam    Nursing note and vitals reviewed.  Constitutional: He appears well-developed and well-nourished. He is active.  Non-toxic appearance. He does not have a sickly appearance. He appears ill. No distress.   Patient appears to feel ill and uncomfortable but not toxic he is awake alert and oriented and able to answer questions.  He has been actively vomiting--emesis is clear to yellow, no coffee-ground emesis or visualized blood in emesis   HENT:   Head: Normocephalic and atraumatic.   Right Ear: Tympanic membrane normal.   Left Ear: Tympanic membrane normal.   Nose: Nose normal.   Mouth/Throat: Uvula is midline and oropharynx is clear and moist. Mucous membranes are dry. No oropharyngeal exudate, posterior oropharyngeal edema or posterior oropharyngeal erythema.   Eyes: Conjunctivae, EOM and lids are normal. Pupils are equal, round, and reactive to light.   Neck: Full passive range of motion without pain. Neck supple. No thyromegaly  present. No spinous process tenderness and no muscular tenderness present. No tracheal deviation, no edema, no erythema and normal range of motion present. No neck rigidity. No JVD present.   Cardiovascular: Normal rate, regular rhythm, normal heart sounds, intact distal pulses and normal pulses. Exam reveals no gallop and no friction rub.    No murmur heard.  Pulmonary/Chest: Effort normal and breath sounds normal. No stridor. No respiratory distress. He has no wheezes. He has no rhonchi. He has no rales.   Abdominal: Soft. Normal appearance. He exhibits no distension and no mass. Bowel sounds are decreased. There is no hepatosplenomegaly. There is generalized tenderness. There is guarding. There is no rigidity, no rebound, no CVA tenderness, no tenderness at McBurney's point and negative Muñoz's sign. No hernia.   Musculoskeletal: Normal range of motion. He exhibits no edema or tenderness.        Cervical back: Normal. He exhibits normal range of motion, no tenderness, no bony tenderness and no swelling.        Thoracic back: He exhibits normal range of motion, no tenderness, no bony tenderness and no swelling.        Lumbar back: Normal. He exhibits normal range of motion, no tenderness, no bony tenderness, no swelling and no edema.   Pulses are 2+ throughout, cap refill is less than 2 sec throughout, no edema noted, extremities are nontender throughout with full range of motion   Neurological: He is alert and oriented to person, place, and time. He has normal strength. No cranial nerve deficit or sensory deficit. Coordination normal.   Skin: Skin is warm and dry. Capillary refill takes less than 2 seconds. No ecchymosis, no petechiae and no rash noted. No cyanosis or erythema. No pallor.   Psychiatric: He has a normal mood and affect. His speech is normal and behavior is normal. Judgment and thought content normal. Cognition and memory are normal.         ED Course   Procedures  Labs Reviewed   CBC W/ AUTO  DIFFERENTIAL - Abnormal; Notable for the following components:       Result Value    WBC 19.28 (*)     RBC 6.21 (*)     Mean Corpuscular Hemoglobin 26.7 (*)     Gran # (ANC) 17.4 (*)     Immature Grans (Abs) 0.08 (*)     Lymph # 0.7 (*)     Gran% 90.4 (*)     Lymph% 3.6 (*)     All other components within normal limits   COMPREHENSIVE METABOLIC PANEL - Abnormal; Notable for the following components:    Glucose 126 (*)     BUN, Bld 21 (*)     Total Protein 8.7 (*)     Albumin 5.7 (*)     All other components within normal limits   URINALYSIS, REFLEX TO URINE CULTURE - Abnormal; Notable for the following components:    Specific Gravity, UA >1.030 (*)     Protein, UA 1+ (*)     All other components within normal limits    Narrative:     Specimen Source->Urine   CK - Abnormal; Notable for the following components:     (*)     All other components within normal limits   URINALYSIS MICROSCOPIC - Abnormal; Notable for the following components:    WBC, UA 9 (*)     Hyaline Casts, UA 25 (*)     All other components within normal limits    Narrative:     Specimen Source->Urine   THROAT SCREEN, RAPID   CULTURE, BLOOD   CULTURE, BLOOD   CULTURE, STREP A,  THROAT   AMYLASE   LIPASE   DRUG SCREEN PANEL, URINE EMERGENCY    Narrative:     Specimen Source->Urine   INFLUENZA A AND B ANTIGEN    Narrative:     Specimen Source->Nasopharyngeal Swab   AMYLASE   MAGNESIUM   TROPONIN I   CK   CK-MB   AMYLASE   TYPE & SCREEN          Imaging Results          CT Abdomen Pelvis With Contrast (Final result)  Result time 11/18/19 20:49:11    Final result by Bruno Michaud MD (11/18/19 20:49:11)                 Narrative:        Exam: CT OF THE ABDOMEN/PELVIS WITH IV CONTRAST    Clinical data: Abdominal pain, fever.    Technique: Direct contiguous axial CT images were acquired through the abdomen  and pelvis with intravenous contrast using soft tissue and bone algorithms. Oral  contrast was not administered. Reformatted/MPR images  were performed. Contrast:  Applied. Radiation dose:  CTDIvol = 29.1 mGy, DLP = 780.10 mGy x cm.    Limitations: Lack of oral contrast limits evaluation of the bowel loops.    Prior Studies: No prior studies submitted.    Findings: Lung bases:  Clear    Liver:   Unremarkable size and contour. Normal density. No evidence of mass. No  evidence of dilated ducts.    Gallbladder:  Unremarkable    Spleen:  Grossly unremarkable.    Pancreas/adrenal glands:   Grossly unremarkable size, contour and density.    Kidneys:   In anatomic position. Grossly unremarkable renal size, contour and  density. No renal or ureteral calculi. No evidence of a renal mass or cyst.  Perinephric space is unremarkable.    Retroperitoneum: No enlarged retroperitoneal lymphadenopathy. The aorta and IVC  appear unremarkable.    Peritoneal cavity:  No evidence of free air or ascites.    Gastrointestinal tract: No obstruction.    Appendix:  Unremarkable    Pelvis:  Solid and hollow viscera grossly unremarkable.    Osseous structures:  No acute or destructive bony process identified.    IMPRESSION:    No acute intra-abdominal pathology.  No abdominal fat stranding or collection.    Recommendation:    Follow up as clinically indicated.    All CT scans at this facility utilize dose modulation, iterative reconstruction,  and/or weight based dosing when appropriate to reduce radiation dose to as low  as reasonably achievable.      Electronically Signed by CHELSEY GARCIA MD at 11/18/2019 10:21:36 PM                             CTA Chest Non-Coronary (PE Study) (Final result)  Result time 11/18/19 20:49:14    Final result by Cady Colon MD (11/18/19 20:49:14)                 Narrative:        Exam: CTA OF THE CHEST WITH CONTRAST    Clinical data: Upper abdominal pain, vomiting, pleuritic pain.    Technique: Axial CT angiography images through the lungs were acquired with  contrast and imaged using soft tissue and lung algorithms. Coronal, sagittal,  and  3D volume reconstructions were performed. Reformatted/3D-MPR images were  performed. Contrast used: Omnipaque 350. Amount: 100 mL. Radiation dose: CTDIvol  = 19.70 mGy, DLP = 780.10 mGy x cm.    Prior studies: No prior studies submitted.    Findings:    Lungs: No pulmonary infiltrate identified. No pulmonary mass identified. No  pleural effusions identified. No pneumothorax. The airway is clear.    Soft Tissues: No mediastinal, axillary or supraclavicular adenopathy is  identified.    Vascular: No filling defect within the pulmonary arteries to the segmental  branch level. Unremarkable aorta. Grossly unremarkable sized heart.  No definite  abnormality seen on 3D reformatted images.    Bony structures: No acute or destructive abnormality    Upper Abdomen: Limited visualization of the solid upper abdominal organs is  grossly unremarkable.    IMPRESSION:    No acute pulmonary thromboembolism or pulmonary hypertension.  No pulmonary infiltrate / pulmonary mass / pleural effusions / pneumothorax.    Recommendation:  Follow up as clinically indicated.    All CT scans at this facility utilize dose modulation, iterative reconstruction,  and/or weight based dosing when appropriate to reduce radiation dose to as low  as reasonably achievable.      Electronically Signed by TATIANA GARCES MD at 11/18/2019 10:31:05 PM                             X-Ray Abdomen Flat And Erect (Final result)  Result time 11/18/19 15:55:35    Final result by Jad Vasquez MD (11/18/19 15:55:35)                 Impression:      Unremarkable abdominal radiograph.      Electronically signed by: Jad Vasquez MD  Date:    11/18/2019  Time:    15:55             Narrative:    EXAMINATION:  XR ABDOMEN FLAT AND ERECT    CLINICAL HISTORY:  Upper abdominal pain, unspecified    FINDINGS:  Five radiographic AP views of the abdomen with supine and erect films and comparison abdominal radiograph dated 07/23/2019. multiple loops of the stool and gas-filled  bowel noted in a nonobstructive pattern.  No abnormal calcifications identified.  The lung bases are clear.  The osseous structures are unremarkable.                                 Medical Decision Making:   Clinical Tests:   Lab Tests: Reviewed  Radiological Study: Reviewed  Medical Tests: Reviewed  ED Management:  Patient still with emesis after several doses of antiemetics.  He was not able to tolerate oral contrast.  Temperature has improved.  Influenza is negative. White blood cell count [place] elevated at [address].  CT scan of the chest abdomen pelvis are negative for acute intra-abdominal or intrathoracic abnormality.  There is no evidence of cholecystitis or appendicitis by CT scan.  Patient still with emesis and does appear improved however still is uncomfortable.  He is unable to tolerate oral fluids.  I will discuss the case with the hospitalist for admission.                   ED Course as of Nov 18 2151   Mon Nov 18, 2019   1511 N/v/d today. Symptom onset this morning. Now with abdominal pain    [BF]      ED Course User Index  [BF] RORO Lloyd                Clinical Impression:       ICD-10-CM ICD-9-CM   1. Upper abdominal pain, unspecified R10.10 789.09   2. Pain R52 780.96   3.  Intractable vomiting                          Steff Jones MD  11/18/19 1030       Steff Jones MD  11/18/19 9822

## 2019-11-19 NOTE — SUBJECTIVE & OBJECTIVE
Interval History:   Patient was seen and examined at bedside.  Denies any new symptoms.  No more black bowel movements.  He is going for EGD.  Denies any new symptoms.     Review of Systems   Constitutional: Negative for activity change and appetite change.   HENT: Negative for congestion and sore throat.    Eyes: Negative for discharge and visual disturbance.   Respiratory: Negative for cough and chest tightness.    Cardiovascular: Negative for chest pain and leg swelling.   Gastrointestinal: Positive for nausea. Negative for abdominal pain.   Genitourinary: Negative for dysuria and hematuria.   Musculoskeletal: Negative for myalgias.   Skin: Negative for pallor and rash.   Neurological: Negative for dizziness and weakness.   Psychiatric/Behavioral: Negative for behavioral problems. The patient is not nervous/anxious.    All other systems reviewed and are negative.    Objective:     Vital Signs (Most Recent):  Temp: 98.3 °F (36.8 °C) (11/19/19 1643)  Pulse: 74 (11/19/19 1643)  Resp: 16 (11/19/19 1643)  BP: 110/71 (11/19/19 1643)  SpO2: 95 % (11/19/19 1643) Vital Signs (24h Range):  Temp:  [97.6 °F (36.4 °C)-99 °F (37.2 °C)] 98.3 °F (36.8 °C)  Pulse:  [] 74  Resp:  [10-28] 16  SpO2:  [95 %-100 %] 95 %  BP: (104-152)/(53-93) 110/71     Weight: 93.9 kg (207 lb 0.2 oz)  Body mass index is 28.87 kg/m².    Intake/Output Summary (Last 24 hours) at 11/19/2019 1652  Last data filed at 11/19/2019 1602  Gross per 24 hour   Intake 1600 ml   Output --   Net 1600 ml      Physical Exam   Constitutional: He is oriented to person, place, and time. He appears well-developed and well-nourished.   HENT:   Head: Atraumatic.   Mouth/Throat: Oropharynx is clear and moist.   Eyes: Pupils are equal, round, and reactive to light. EOM are normal.   Neck: Neck supple. No JVD present.   Cardiovascular: Normal rate, regular rhythm and normal heart sounds. Exam reveals no gallop.   No murmur heard.  Pulmonary/Chest: Breath sounds normal.  No respiratory distress. He has no wheezes.   Abdominal: Soft. Bowel sounds are normal. He exhibits no distension. There is no rebound and no guarding.   Musculoskeletal: He exhibits no edema.   Lymphadenopathy:     He has no cervical adenopathy.   Neurological: He is alert and oriented to person, place, and time. No cranial nerve deficit.   Skin: Skin is warm. Capillary refill takes less than 2 seconds. No rash noted.   Psychiatric: His behavior is normal.   Nursing note and vitals reviewed.      Significant Labs: All pertinent labs within the past 24 hours have been reviewed.    Significant Imaging: I have reviewed all pertinent imaging results/findings within the past 24 hours.

## 2019-11-19 NOTE — NURSING
Received report from Arvind CRYSTAL RN (ED nurse)    Pt arrived via wheelchair as assisted by RASHAD Samuels. Pt is accompanied by significant other Kendi, and family friend. Transferred to bed under own power; gait steady.    Presently denying and pain or discomfort.

## 2019-11-19 NOTE — HPI
"24 year old English speaking  man presented to ED complaining of severe epigastric pain 8-9/10 intermittent, waxing and waning of own accord. This AM while at work he felt like he had to use the restroom. He had "30 minutes of diarrhea". The stool was jet black in color. He then developed severe nausea, followed by 12-13 episodes of vomiting brown material like "coffe grounds". He felt weak and dizzy, with the nausea persisting. He came to ED, where he continued to be nauseous with vomiting anything he put in his mouth. He was unable to keep the contrast down for his CT. Currently he is still "very nauseaous" but no more vomiting. Has been on H2 blockers in past for "upset stomach".  Last alcoholic drink was months ago.     Review of labs shows leukocytosis of 19K. H/H = 16.6/51.3, but may be volume contracted. Chemistry shows mild pre-renal azotemia. Cardiac bio-markers are normal. U-tox is completely negative. Amylase and lipase are normal. CT Abdomen personally reviewed and did not show any acute pathology. Influenzas non-reactive. Personally discussed patient with ED physician and will start iv PPI because of his history. He will be volume resuscitated. GI will be consulted  "

## 2019-11-19 NOTE — PLAN OF CARE
Problem: Adult Inpatient Plan of Care  Goal: Plan of Care Review  Outcome: Ongoing, Progressing  Goal: Patient-Specific Goal (Individualization)  Outcome: Ongoing, Progressing  Goal: Absence of Hospital-Acquired Illness or Injury  Outcome: Ongoing, Progressing  Goal: Optimal Comfort and Wellbeing  Outcome: Ongoing, Progressing  Goal: Readiness for Transition of Care  Outcome: Ongoing, Progressing  Goal: Rounds/Family Conference  Outcome: Ongoing, Progressing     Problem: Bleeding (Gastrointestinal Bleeding)  Goal: Hemostasis  Outcome: Ongoing, Progressing

## 2019-11-19 NOTE — CARE UPDATE
This note also relates to the following rows which could not be included:  SpO2 - Cannot attach notes to unvalidated device data  Pulse - Cannot attach notes to unvalidated device data       11/19/19 0025   Patient Assessment/Suction   Level of Consciousness (AVPU) alert   Respiratory Effort Normal;Unlabored   Expansion/Accessory Muscles/Retractions expansion symmetric;no retractions;no use of accessory muscles   All Lung Fields Breath Sounds clear   Rhythm/Pattern, Respiratory no shortness of breath reported;pattern regular;unlabored   PRE-TX-O2   O2 Device (Oxygen Therapy) room air   Pulse Oximetry Type Intermittent   $ Pulse Oximetry - Multiple Charge Pulse Oximetry - Multiple   Resp 14   Respiratory Interventions   Cough And Deep Breathing done with encouragement   Breathing Techniques/Airway Clearance deep/controlled cough encouraged;diaphragmatic breathing promoted

## 2019-11-19 NOTE — PLAN OF CARE
This note also relates to the following rows which could not be included:  SpO2 - Cannot attach notes to unvalidated device data  Pulse - Cannot attach notes to unvalidated device data       11/19/19 0025   Patient Assessment/Suction   Level of Consciousness (AVPU) alert   Respiratory Effort Normal;Unlabored   Expansion/Accessory Muscles/Retractions expansion symmetric;no retractions;no use of accessory muscles   All Lung Fields Breath Sounds clear   Rhythm/Pattern, Respiratory no shortness of breath reported;pattern regular;unlabored   PRE-TX-O2   O2 Device (Oxygen Therapy) room air   Resp 14   Respiratory Interventions   Cough And Deep Breathing done with encouragement   Breathing Techniques/Airway Clearance deep/controlled cough encouraged;diaphragmatic breathing promoted   Resp assessment done. Pt does not require Respiratory Services at this time.

## 2019-11-19 NOTE — H&P
"[facility] Medicine  History & Physical    Patient Name: Clinton Brooks  MRN: [id number]  Admission Date: 11/18/2019  Attending Physician: Sumit Aguilar MD  Primary Care Provider: Primary Doctor No         Patient information was obtained from patient and ER records.     Subjective:     Principal Problem:Acute upper GI bleed    Chief Complaint:   Chief Complaint   Patient presents with    Abdominal Pain        HPI: 24 year old English speaking  man presented to ED complaining of severe epigastric pain 8-9/10 intermittent, waxing and waning of own accord. This AM while at work he felt like he had to use the restroom. He had "30 minutes of diarrhea". The stool was jet black in color. He then developed severe nausea, followed by 12-13 episodes of vomiting brown material like "coffe grounds". He felt weak and dizzy, with the nausea persisting. He came to ED, where he continued to be nauseous with vomiting anything he put in his mouth. He was unable to keep the contrast down for his CT. Currently he is still "very nauseaous" but no more vomiting. Has been on H2 blockers in past for "upset stomach".  Last alcoholic drink was months ago.     Review of labs shows leukocytosis of 19K. H/H = 16.6/51.3, but may be volume contracted. Chemistry shows mild pre-renal azotemia. Cardiac bio-markers are normal. U-tox is completely negative. Amylase and lipase are normal. CT Abdomen personally reviewed and did not show any acute pathology. Influenzas non-reactive. Personally discussed patient with ED physician and will start iv PPI because of his history. He will be volume resuscitated. GI will be consulted    No new subjective & objective note has been filed under this hospital service since the last note was generated.    Assessment/Plan:     * Acute upper GI bleed  Hydration, NPO, GI consult; repeat labs in AM        VTE Risk Mitigation (From admission, onward)    None             Sumit Aguilar, " MD  Department of Hospital Medicine   [facility]

## 2019-11-19 NOTE — CONSULTS
GASTROENTEROLOGY INPATIENT CONSULT NOTE  Patient Name: Clinton Brooks  Patient MRN: [id number]  Patient : 1995    Admit Date: 2019  Service date: 2019    Reason for Consult: epig pain / melena    PCP: Primary Doctor No    Chief Complaint   Patient presents with    Abdominal Pain       HPI: Patient is a 24 y.o. male without signficant med history presents for epig pain w/ suspected melena / coffee ground emesis. Acute onset, intermittent, slight progression prior to admission but now improving. No melena since admission. Denies NSAIDs or previous endoscopies. Grew up in [place]. W/u in ER as below w/o any concerns.     CHART REVIEW:   Nml CBC, CMP, lipase  CT Abd  - Nml panc, gb, biliary, liver, bowels    Past Medical History:  History reviewed. No pertinent past medical history.     Past Surgical History:  History reviewed. No pertinent surgical history.     Home Medications:  Medications Prior to Admission   Medication Sig Dispense Refill Last Dose    cetirizine (ZYRTEC) 10 MG tablet Take 1 tablet (10 mg total) by mouth once daily. 30 tablet 0 2019    famotidine (PEPCID) 20 MG tablet Take 1 tablet (20 mg total) by mouth 2 (two) times daily. 20 tablet 0     fluticasone (FLONASE) 50 mcg/actuation nasal spray 1 spray (50 mcg total) by Each Nare route 2 (two) times daily as needed. 15 g 0 2019    ondansetron (ZOFRAN-ODT) 4 MG TbDL Take 1 tablet (4 mg total) by mouth every 6 (six) hours as needed. 20 tablet 0        Inpatient Medications:   pantoprazole  40 mg Intravenous Daily         Review of patient's allergies indicates:  No Known Allergies    Social History:   Social History     Occupational History    Not on file   Tobacco Use    Smoking status: Never Smoker    Smokeless tobacco: Never Used   Substance and Sexual Activity    Alcohol use: No     Frequency: Never     Comment: social    Drug use: No    Sexual activity: Yes       Family History:   Family History  "  Problem Relation Age of Onset    Arthritis Mother     Asthma Mother     Cancer Mother     Diabetes Mother     Hypertension Mother     Arthritis Father     Diabetes Father     Hypertension Father     Asthma Maternal Aunt     Cancer Maternal Aunt     Diabetes Maternal Aunt     Hypertension Maternal Aunt     Asthma Maternal Uncle     Diabetes Maternal Uncle     Hypertension Maternal Uncle     Hypertension Paternal Aunt     Hypertension Paternal Uncle     Arthritis Maternal Grandmother     Cancer Maternal Grandmother     Heart disease Maternal Grandmother     Hypertension Maternal Grandmother     Arthritis Maternal Grandfather     Heart disease Maternal Grandfather     Hypertension Maternal Grandfather     Arthritis Paternal Grandmother     Hypertension Paternal Grandmother     Arthritis Paternal Grandfather     Hypertension Paternal Grandfather        Review of Systems:  A 10 point review of systems was performed and was normal, except as mentioned in the HPI, including constitutional, HEENT, heme, lymph, cardiovascular, respiratory, gastrointestinal, genitourinary, neurologic, endocrine, psychiatric and musculoskeletal.      OBJECTIVE:    Physical Exam:  24 Hour Vital Sign Ranges: Temp:  [97.6 °F (36.4 °C)-99 °F (37.2 °C)] 97.6 °F (36.4 °C)  Pulse:  [] 80  Resp:  [14-28] 19  SpO2:  [96 %-100 %] 99 %  BP: (106-156)/(57-73) 114/68  Most recent vitals: /68   Pulse 80   Temp 97.6 °F (36.4 °C) (Oral)   Resp 19   Ht 5' 11" (1.803 m)   Wt 93.9 kg (207 lb 0.2 oz)   SpO2 99%   BMI 28.87 kg/m²    GEN: well-developed, well-nourished, awake and alert, non-toxic appearing adult  HEENT: PERRL, sclera anicteric, oral mucosa pink and moist without lesion  NECK: trachea midline; Good ROM  CV: regular rate and rhythm, no murmurs or gallops  RESP: clear to auscultation bilaterally, no wheezes, rhonci or rales  ABD: soft, non-tender, non-distended, normal bowel sounds, no " hepatosplenomegaly, no hernias  EXT: no swelling or edema, 2+ pulses distally  SKIN: no rashes or jaundice  PSYCH: normal affect    Labs:   Recent Labs     11/18/19  1540 11/19/19  0552   WBC 19.28* 8.86   MCV 83 82    202     Recent Labs     11/18/19  1540 11/19/19  0552    140   K 3.9 3.7    105   CO2 26 24   BUN 21* 24*   * 116*     No results for input(s): ALB in the last 72 hours.    Invalid input(s): ALKP, SGOT, SGPT, TBIL, DBIL, TPRO  No results for input(s): PT, INR, PTT in the last 72 hours.      Radiology Review:  CT Abdomen Pelvis With Contrast   Final Result      CTA Chest Non-Coronary (PE Study)   Final Result      X-Ray Abdomen Flat And Erect   Final Result      Unremarkable abdominal radiograph.         Electronically signed by: Jad Vasquez MD   Date:    11/18/2019   Time:    15:55            IMPRESSION / RECOMMENDATIONS:   24 y.o. male without signficant med history presents for epig pain w/ suspected melena / coffee ground emesis.  EGD w/ bx to further evaluate. Risks, benefits, alternative discussed in detail with patient / family regarding anticipated procedure and possible complications.     EGD w/ bx today    Thank you for this consult.    Kemar Pittman III  11/19/2019  1:07 PM

## 2019-11-19 NOTE — PROGRESS NOTES
"[facility] Medicine  Progress Note    DOS:11/19/19    Patient Name: Clinton Brooks  MRN: [id number]  Patient Class: OP- Observation   Admission Date: 11/18/2019  Length of Stay: 1 days  Attending Physician: Paresh Sanchez MD  Primary Care Provider: Primary Doctor No        Subjective:     Principal Problem:Acute upper GI bleed        HPI:  24 year old English speaking  man presented to ED complaining of severe epigastric pain 8-9/10 intermittent, waxing and waning of own accord. This AM while at work he felt like he had to use the restroom. He had "30 minutes of diarrhea". The stool was jet black in color. He then developed severe nausea, followed by 12-13 episodes of vomiting brown material like "coffe grounds". He felt weak and dizzy, with the nausea persisting. He came to ED, where he continued to be nauseous with vomiting anything he put in his mouth. He was unable to keep the contrast down for his CT. Currently he is still "very nauseaous" but no more vomiting. Has been on H2 blockers in past for "upset stomach".  Last alcoholic drink was months ago.     Review of labs shows leukocytosis of 19K. H/H = 16.6/51.3, but may be volume contracted. Chemistry shows mild pre-renal azotemia. Cardiac bio-markers are normal. U-tox is completely negative. Amylase and lipase are normal. CT Abdomen personally reviewed and did not show any acute pathology. Influenzas non-reactive. Personally discussed patient with ED physician and will start iv PPI because of his history. He will be volume resuscitated. GI will be consulted    Overview/Hospital Course:  11/19:  Patient was seen and examined at bedside.  Denies any new symptoms.  No more black bowel movements.  He is going for EGD.  Denies any new symptoms.     Interval History:   Patient was seen and examined at bedside.  Denies any new symptoms.  No more black bowel movements.  He is going for EGD.  Denies any new symptoms.     Review of " Systems   Constitutional: Negative for activity change and appetite change.   HENT: Negative for congestion and sore throat.    Eyes: Negative for discharge and visual disturbance.   Respiratory: Negative for cough and chest tightness.    Cardiovascular: Negative for chest pain and leg swelling.   Gastrointestinal: Positive for nausea. Negative for abdominal pain.   Genitourinary: Negative for dysuria and hematuria.   Musculoskeletal: Negative for myalgias.   Skin: Negative for pallor and rash.   Neurological: Negative for dizziness and weakness.   Psychiatric/Behavioral: Negative for behavioral problems. The patient is not nervous/anxious.    All other systems reviewed and are negative.    Objective:     Vital Signs (Most Recent):  Temp: 98.3 °F (36.8 °C) (11/19/19 1643)  Pulse: 74 (11/19/19 1643)  Resp: 16 (11/19/19 1643)  BP: 110/71 (11/19/19 1643)  SpO2: 95 % (11/19/19 1643) Vital Signs (24h Range):  Temp:  [97.6 °F (36.4 °C)-99 °F (37.2 °C)] 98.3 °F (36.8 °C)  Pulse:  [] 74  Resp:  [10-28] 16  SpO2:  [95 %-100 %] 95 %  BP: (104-152)/(53-93) 110/71     Weight: 93.9 kg (207 lb 0.2 oz)  Body mass index is 28.87 kg/m².    Intake/Output Summary (Last 24 hours) at 11/19/2019 1652  Last data filed at 11/19/2019 1602  Gross per 24 hour   Intake 1600 ml   Output --   Net 1600 ml      Physical Exam   Constitutional: He is oriented to person, place, and time. He appears well-developed and well-nourished.   HENT:   Head: Atraumatic.   Mouth/Throat: Oropharynx is clear and moist.   Eyes: Pupils are equal, round, and reactive to light. EOM are normal.   Neck: Neck supple. No JVD present.   Cardiovascular: Normal rate, regular rhythm and normal heart sounds. Exam reveals no gallop.   No murmur heard.  Pulmonary/Chest: Breath sounds normal. No respiratory distress. He has no wheezes.   Abdominal: Soft. Bowel sounds are normal. He exhibits no distension. There is no rebound and no guarding.   Musculoskeletal: He exhibits  no edema.   Lymphadenopathy:     He has no cervical adenopathy.   Neurological: He is alert and oriented to person, place, and time. No cranial nerve deficit.   Skin: Skin is warm. Capillary refill takes less than 2 seconds. No rash noted.   Psychiatric: His behavior is normal.   Nursing note and vitals reviewed.      Significant Labs: All pertinent labs within the past 24 hours have been reviewed.    Significant Imaging: I have reviewed all pertinent imaging results/findings within the past 24 hours.      Assessment/Plan:      * Acute upper GI bleed  As per patient he had 12 black bowel movements yesterday  NPO  No more black bowel movements  GI is consulted-going for EGD today  Advancement of diet as per GI  PPI    Epigastric abdominal pain  Due to above  Resolved        VTE Risk Mitigation (From admission, onward)         Ordered     IP VTE LOW RISK PATIENT  Once      11/19/19 0101     Place JORDIN hose  Until discontinued      11/19/19 0101                      Paresh Sanchez MD  Department of Hospital Medicine   [facility]

## 2019-11-19 NOTE — ASSESSMENT & PLAN NOTE
As per patient he had 12 black bowel movements yesterday  NPO  No more black bowel movements  GI is consulted-going for EGD today  Advancement of diet as per GI  PPI

## 2019-11-19 NOTE — PLAN OF CARE
Problem: Bleeding (Gastrointestinal Bleeding)  Goal: Hemostasis  Outcome: Ongoing, Progressing     Problem: Adjustment to Illness (Gastrointestinal Bleeding)  Goal: Optimal Coping with Acute Illness  Outcome: Ongoing, Progressing     Problem: Adult Inpatient Plan of Care  Goal: Rounds/Family Conference  Outcome: Ongoing, Progressing     Problem: Adult Inpatient Plan of Care  Goal: Patient-Specific Goal (Individualization)  Outcome: Ongoing, Progressing     Problem: Adult Inpatient Plan of Care  Goal: Plan of Care Review  Outcome: Ongoing, Progressing

## 2019-11-19 NOTE — H&P
"[facility] Medicine  History & Physical    Patient Name: Clinton Brooks  MRN: [id number]  Admission Date: 11/18/2019  Attending Physician: Sumit Aguilar MD  Primary Care Provider: Primary Doctor No         Patient information was obtained from patient and ER records.     Subjective:     Principal Problem:Acute upper GI bleed    Chief Complaint:   Chief Complaint   Patient presents with    Abdominal Pain        HPI: 24 year old English speaking  man presented to ED complaining of severe epigastric pain 8-9/10 intermittent, waxing and waning of own accord. This AM while at work he felt like he had to use the restroom. He had "30 minutes of diarrhea". The stool was jet black in color. He then developed severe nausea, followed by 12-13 episodes of vomiting brown material like "coffe grounds". He felt weak and dizzy, with the nausea persisting. He came to ED, where he continued to be nauseous with vomiting anything he put in his mouth. He was unable to keep the contrast down for his CT. Currently he is still "very nauseaous" but no more vomiting. Has been on H2 blockers in past for "upset stomach".  Last alcoholic drink was months ago.     Review of labs shows leukocytosis of 19K. H/H = 16.6/51.3, but may be volume contracted. Chemistry shows mild pre-renal azotemia. Cardiac bio-markers are normal. U-tox is completely negative. Amylase and lipase are normal. CT Abdomen personally reviewed and did not show any acute pathology. Influenzas non-reactive. Personally discussed patient with ED physician and will start iv PPI because of his history. He will be volume resuscitated. GI will be consulted    History reviewed. No pertinent past medical history.    History reviewed. No pertinent surgical history.    Review of patient's allergies indicates:  No Known Allergies    No current facility-administered medications on file prior to encounter.      Current Outpatient Medications on File Prior to " Encounter   Medication Sig    cetirizine (ZYRTEC) 10 MG tablet Take 1 tablet (10 mg total) by mouth once daily.    famotidine (PEPCID) 20 MG tablet Take 1 tablet (20 mg total) by mouth 2 (two) times daily.    fluticasone (FLONASE) 50 mcg/actuation nasal spray 1 spray (50 mcg total) by Each Nare route 2 (two) times daily as needed.    ondansetron (ZOFRAN-ODT) 4 MG TbDL Take 1 tablet (4 mg total) by mouth every 6 (six) hours as needed.     Family History     None        Tobacco Use    Smoking status: Never Smoker    Smokeless tobacco: Never Used   Substance and Sexual Activity    Alcohol use: No     Frequency: Never     Comment: social    Drug use: No    Sexual activity: Not on file     Review of Systems   Constitutional: Negative.    HENT: Negative.    Eyes: Negative.    Respiratory: Negative.    Cardiovascular: Negative.    Gastrointestinal: Positive for abdominal pain, diarrhea, nausea and vomiting.   Endocrine: Negative.    Genitourinary: Negative.    Musculoskeletal: Negative.    Skin: Negative.    Allergic/Immunologic: Negative.    Neurological: Negative.    Hematological: Negative.    All other systems reviewed and are negative.    Objective:     Vital Signs (Most Recent):  Temp: 97.9 °F (36.6 °C) (11/18/19 1513)  Pulse: 93 (11/18/19 1908)  Resp: 20 (11/18/19 1513)  BP: 129/73 (11/18/19 1908)  SpO2: 100 % (11/18/19 1908) Vital Signs (24h Range):  Temp:  [97.9 °F (36.6 °C)] 97.9 °F (36.6 °C)  Pulse:  [91-93] 93  Resp:  [20] 20  SpO2:  [100 %] 100 %  BP: (129-156)/(71-73) 129/73     Weight: 93.9 kg (207 lb)  Body mass index is 28.87 kg/m².    Physical Exam   Constitutional: He is oriented to person, place, and time. He appears well-developed and well-nourished.   HENT:   Head: Normocephalic and atraumatic.   Right Ear: External ear normal.   Left Ear: External ear normal.   Nose: Nose normal.   Mouth/Throat: Oropharynx is clear and moist.   Eyes: Pupils are equal, round, and reactive to light.  Conjunctivae and EOM are normal.   Neck: Normal range of motion. Neck supple.   Cardiovascular: Normal rate, regular rhythm, normal heart sounds and intact distal pulses.   Pulmonary/Chest: Effort normal and breath sounds normal.   Abdominal:   Soft with voluntary guarding to minimal palpation; is tender in epigastrium; no peritoneal signs; BS+ (audible)   Musculoskeletal: Normal range of motion.   Neurological: He is alert and oriented to person, place, and time.   Skin: Skin is warm and dry. Capillary refill takes less than 2 seconds.   Psychiatric: He has a normal mood and affect. His behavior is normal. Judgment and thought content normal.   Nursing note and vitals reviewed.        CRANIAL NERVES     CN III, IV, VI   Pupils are equal, round, and reactive to light.  Extraocular motions are normal.        Significant Labs:   CBC:   Recent Labs   Lab 11/18/19  1540   WBC 19.28*   HGB 16.6   HCT 51.3        CMP:   Recent Labs   Lab 11/18/19  1540      K 3.9      CO2 26   *   BUN 21*   CREATININE 1.1   CALCIUM 9.7   PROT 8.7*   ALBUMIN 5.7*   BILITOT 0.7   ALKPHOS 55   AST 33   ALT 28   ANIONGAP 12   EGFRNONAA >60.0     Cardiac Markers: No results for input(s): CKMB, MYOGLOBIN, BNP, TROPISTAT in the last 48 hours.  Lipase:   Recent Labs   Lab 11/18/19  1540   LIPASE 28     Troponin:   Recent Labs   Lab 11/18/19  1815   TROPONINI <0.030       Significant Imaging: I have reviewed and interpreted all pertinent imaging results/findings within the past 24 hours.    Assessment/Plan:     * Acute upper GI bleed  Hydration, NPO, GI consult; repeat labs in AM        VTE Risk Mitigation (From admission, onward)    None             Sumit Aguilar MD  Department of Hospital Medicine   [facility]

## 2019-11-19 NOTE — SUBJECTIVE & OBJECTIVE
History reviewed. No pertinent past medical history.    History reviewed. No pertinent surgical history.    Review of patient's allergies indicates:  No Known Allergies    No current facility-administered medications on file prior to encounter.      Current Outpatient Medications on File Prior to Encounter   Medication Sig    cetirizine (ZYRTEC) 10 MG tablet Take 1 tablet (10 mg total) by mouth once daily.    famotidine (PEPCID) 20 MG tablet Take 1 tablet (20 mg total) by mouth 2 (two) times daily.    fluticasone (FLONASE) 50 mcg/actuation nasal spray 1 spray (50 mcg total) by Each Nare route 2 (two) times daily as needed.    ondansetron (ZOFRAN-ODT) 4 MG TbDL Take 1 tablet (4 mg total) by mouth every 6 (six) hours as needed.     Family History     None        Tobacco Use    Smoking status: Never Smoker    Smokeless tobacco: Never Used   Substance and Sexual Activity    Alcohol use: No     Frequency: Never     Comment: social    Drug use: No    Sexual activity: Not on file     Review of Systems   Constitutional: Negative.    HENT: Negative.    Eyes: Negative.    Respiratory: Negative.    Cardiovascular: Negative.    Gastrointestinal: Positive for abdominal pain, diarrhea, nausea and vomiting.   Endocrine: Negative.    Genitourinary: Negative.    Musculoskeletal: Negative.    Skin: Negative.    Allergic/Immunologic: Negative.    Neurological: Negative.    Hematological: Negative.    All other systems reviewed and are negative.    Objective:     Vital Signs (Most Recent):  Temp: 97.9 °F (36.6 °C) (11/18/19 1513)  Pulse: 93 (11/18/19 1908)  Resp: 20 (11/18/19 1513)  BP: 129/73 (11/18/19 1908)  SpO2: 100 % (11/18/19 1908) Vital Signs (24h Range):  Temp:  [97.9 °F (36.6 °C)] 97.9 °F (36.6 °C)  Pulse:  [91-93] 93  Resp:  [20] 20  SpO2:  [100 %] 100 %  BP: (129-156)/(71-73) 129/73     Weight: 93.9 kg (207 lb)  Body mass index is 28.87 kg/m².    Physical Exam   Constitutional: He is oriented to person, place, and  time. He appears well-developed and well-nourished.   HENT:   Head: Normocephalic and atraumatic.   Right Ear: External ear normal.   Left Ear: External ear normal.   Nose: Nose normal.   Mouth/Throat: Oropharynx is clear and moist.   Eyes: Pupils are equal, round, and reactive to light. Conjunctivae and EOM are normal.   Neck: Normal range of motion. Neck supple.   Cardiovascular: Normal rate, regular rhythm, normal heart sounds and intact distal pulses.   Pulmonary/Chest: Effort normal and breath sounds normal.   Abdominal:   Soft with voluntary guarding to minimal palpation; is tender in epigastrium; no peritoneal signs; BS+ (audible)   Musculoskeletal: Normal range of motion.   Neurological: He is alert and oriented to person, place, and time.   Skin: Skin is warm and dry. Capillary refill takes less than 2 seconds.   Psychiatric: He has a normal mood and affect. His behavior is normal. Judgment and thought content normal.   Nursing note and vitals reviewed.        CRANIAL NERVES     CN III, IV, VI   Pupils are equal, round, and reactive to light.  Extraocular motions are normal.        Significant Labs:   CBC:   Recent Labs   Lab 11/18/19  1540   WBC 19.28*   HGB 16.6   HCT 51.3        CMP:   Recent Labs   Lab 11/18/19  1540      K 3.9      CO2 26   *   BUN 21*   CREATININE 1.1   CALCIUM 9.7   PROT 8.7*   ALBUMIN 5.7*   BILITOT 0.7   ALKPHOS 55   AST 33   ALT 28   ANIONGAP 12   EGFRNONAA >60.0     Cardiac Markers: No results for input(s): CKMB, MYOGLOBIN, BNP, TROPISTAT in the last 48 hours.  Lipase:   Recent Labs   Lab 11/18/19  1540   LIPASE 28     Troponin:   Recent Labs   Lab 11/18/19  1815   TROPONINI <0.030       Significant Imaging: I have reviewed and interpreted all pertinent imaging results/findings within the past 24 hours.

## 2019-11-19 NOTE — HOSPITAL COURSE
11/19:  Patient was seen and examined at bedside.  Denies any new symptoms.  No more black bowel movements.  He is going for EGD.  Denies any new symptoms.     During his short hospital stay patient remained hemodynamically stable and no episodes of hematemesis or melena observed.  He underwent EGD and was found to have nonerosive esophagitis as well as nonbleeding ulcers in the esophagus.  He was discharged home on a prescription of Protonix.  He was advised against smoking, alcohol as well as use of NSAIDs.  He was advised to follow up with GI to discuss biopsy results.     Review of Systems   Constitutional: Negative for activity change and appetite change.   HENT: Negative for congestion and sore throat.    Eyes: Negative for discharge and visual disturbance.   Respiratory: Negative for cough and chest tightness.    Cardiovascular: Negative for chest pain and leg swelling.   Gastrointestinal: Positive for nausea. Negative for abdominal pain.   Genitourinary: Negative for dysuria and hematuria.   Musculoskeletal: Negative for myalgias.   Skin: Negative for pallor and rash.   Neurological: Negative for dizziness and weakness.   Psychiatric/Behavioral: Negative for behavioral problems. The patient is not nervous/anxious.    All other systems reviewed and are negative.      Physical Exam   Constitutional: He is oriented to person, place, and time. He appears well-developed and well-nourished.   HENT:   Head: Atraumatic.   Mouth/Throat: Oropharynx is clear and moist.   Eyes: Pupils are equal, round, and reactive to light. EOM are normal.   Neck: Neck supple. No JVD present.   Cardiovascular: Normal rate, regular rhythm and normal heart sounds. Exam reveals no gallop.   No murmur heard.  Pulmonary/Chest: Breath sounds normal. No respiratory distress. He has no wheezes.   Abdominal: Soft. Bowel sounds are normal. He exhibits no distension. There is no rebound and no guarding.   Musculoskeletal: He exhibits no edema.    Lymphadenopathy:   He has no cervical adenopathy.   Neurological: He is alert and oriented to person, place, and time. No cranial nerve deficit.   Skin: Skin is warm. Capillary refill takes less than 2 seconds. No rash noted.   Psychiatric: His behavior is normal.     Nursing note and vitals reviewed.

## 2019-11-19 NOTE — [employer] PATIENT INSTRUCTIONS
Discharge Summary/Instructions after an Endoscopic Procedure  Patient Name: Clinton Brooks  Patient MRN: [id number]  Patient YOB: 1995 Tuesday, November 19, 2019  Kemar Pittman III, MD  RESTRICTIONS:  During your procedure today, you received medications for sedation.  These   medications may affect your judgment, balance and coordination.  Therefore,   for 24 hours, you have the following restrictions:   - DO NOT drive a car, operate machinery, make legal/financial decisions,   sign important papers or drink alcohol.    ACTIVITY:  Today: no heavy lifting, straining or running due to procedural   sedation/anesthesia.  The following day: return to full activity including work.  DIET:  Eat and drink normally unless instructed otherwise.     TREATMENT FOR COMMON SIDE EFFECTS:  - Mild abdominal pain, nausea, belching, bloating or excessive gas:  rest,   eat lightly and use a heating pad.  - Sore Throat: treat with throat lozenges and/or gargle with warm salt   water.  - Because air was used during the procedure, expelling large amounts of air   from your rectum or belching is normal.  - If a bowel prep was taken, you may not have a bowel movement for 1-3 days.    This is normal.  SYMPTOMS TO WATCH FOR AND REPORT TO YOUR PHYSICIAN:  1. Abdominal pain or bloating, other than gas cramps.  2. Chest pain.  3. Back pain.  4. Signs of infection such as: chills or fever occurring within 24 hours   after the procedure.  5. Rectal bleeding, which would show as bright red, maroon, or black stools.   (A tablespoon of blood from the rectum is not serious, especially if   hemorrhoids are present.)  6. Vomiting.  7. Weakness or dizziness.  GO DIRECTLY TO THE NEAREST EMERGENCY ROOM IF YOU HAVE ANY OF THE FOLLOWING:      Difficulty breathing              Chills and/or fever over 101 F   Persistent vomiting and/or vomiting blood   Severe abdominal pain   Severe chest pain   Black, tarry stools   Bleeding- more than one  tablespoon   Any other symptom or condition that you feel may need urgent attention  Your doctor recommends these additional instructions:  If any biopsies were taken, your doctors clinic will contact you in 1 to 2   weeks with any results.  - Return patient to hospital [facility] for ongoing care.   - Use Protonix (pantoprazole) 40 mg PO daily for 4 weeks.  For questions, problems or results please call your physician - Kemar Pittman III, MD at Work:  [phone].  [facility], EMERGENCY ROOM PHONE NUMBER: [phone]  IF A COMPLICATION OR EMERGENCY SITUATION ARISES AND YOU ARE UNABLE TO REACH   YOUR PHYSICIAN - GO DIRECTLY TO THE EMERGENCY ROOM.  Kemar Pittman III, MD  11/19/2019 3:50:40 PM  This report has been verified and signed electronically.  [employer]

## 2019-11-20 VITALS
OXYGEN SATURATION: 99 % | BODY MASS INDEX: 28.98 KG/M2 | SYSTOLIC BLOOD PRESSURE: 155 MMHG | HEIGHT: 71 IN | DIASTOLIC BLOOD PRESSURE: 71 MMHG | RESPIRATION RATE: 18 BRPM | WEIGHT: 207 LBS | HEART RATE: 80 BPM | TEMPERATURE: 98 F

## 2019-11-20 PROBLEM — R10.13 EPIGASTRIC ABDOMINAL PAIN: Status: RESOLVED | Noted: 2019-11-19 | Resolved: 2019-11-20

## 2019-11-20 PROBLEM — K92.2 ACUTE UPPER GI BLEED: Status: RESOLVED | Noted: 2019-11-18 | Resolved: 2019-11-20

## 2019-11-20 LAB — BACTERIA THROAT CULT: NORMAL

## 2019-11-20 PROCEDURE — 96361 HYDRATE IV INFUSION ADD-ON: CPT

## 2019-11-20 PROCEDURE — G0378 HOSPITAL OBSERVATION PER HR: HCPCS

## 2019-11-20 RX ORDER — PANTOPRAZOLE SODIUM 40 MG/10ML
40 INJECTION, POWDER, LYOPHILIZED, FOR SOLUTION INTRAVENOUS DAILY
Qty: 1200 MG | Refills: 0 | Status: SHIPPED | OUTPATIENT
Start: 2019-11-20 | End: 2019-11-20 | Stop reason: HOSPADM

## 2019-11-20 RX ORDER — PANTOPRAZOLE SODIUM 40 MG/1
40 TABLET, DELAYED RELEASE ORAL DAILY
Qty: 30 TABLET | Refills: 0 | OUTPATIENT
Start: 2019-11-20 | End: 2020-03-02

## 2019-11-20 NOTE — PLAN OF CARE
Problem: Adult Inpatient Plan of Care  Goal: Plan of Care Review  Outcome: Ongoing, Progressing  Goal: Optimal Comfort and Wellbeing  Outcome: Ongoing, Progressing  Goal: Readiness for Transition of Care  Outcome: Ongoing, Progressing

## 2019-11-20 NOTE — NURSING
Pt states he had a normal bm and urination today.  He states he is not vomiting now.  He states he is hungary cause he hasn't eaten in days.  He states he's just cold.  Low grade temp noted.  Will monitor. Pt instructed on moving/ changing positions slowly to prevent dizziness. Pt voiced understanding. IV fluids infusing.  Food given.  Girlfriend at bedside.

## 2019-11-21 LAB
STOOL CULTURE: NORMAL
STOOL CULTURE: NORMAL

## 2019-11-21 NOTE — DISCHARGE SUMMARY
"[facility] Medicine  Discharge Summary    DOS:11/20/2019      Patient Name: Clinton Brooks  MRN: [id number]  Admission Date: 11/18/2019  Hospital Length of Stay: 1 days  Discharge Date and Time:  11/20/2019 6:27 PM  Attending Physician: Sasha att. providers found   Discharging Provider: Paresh Sanchez MD  Primary Care Provider: Primary Doctor Sasha      HPI:   24 year old English speaking  man presented to ED complaining of severe epigastric pain 8-9/10 intermittent, waxing and waning of own accord. This AM while at work he felt like he had to use the restroom. He had "30 minutes of diarrhea". The stool was jet black in color. He then developed severe nausea, followed by 12-13 episodes of vomiting brown material like "coffe grounds". He felt weak and dizzy, with the nausea persisting. He came to ED, where he continued to be nauseous with vomiting anything he put in his mouth. He was unable to keep the contrast down for his CT. Currently he is still "very nauseaous" but no more vomiting. Has been on H2 blockers in past for "upset stomach".  Last alcoholic drink was months ago.     Review of labs shows leukocytosis of 19K. H/H = 16.6/51.3, but may be volume contracted. Chemistry shows mild pre-renal azotemia. Cardiac bio-markers are normal. U-tox is completely negative. Amylase and lipase are normal. CT Abdomen personally reviewed and did not show any acute pathology. Influenzas non-reactive. Personally discussed patient with ED physician and will start iv PPI because of his history. He will be volume resuscitated. GI will be consulted    Procedure(s) (LRB):  EGD (ESOPHAGOGASTRODUODENOSCOPY) (N/A)      Hospital Course:   11/19:  Patient was seen and examined at bedside.  Denies any new symptoms.  No more black bowel movements.  He is going for EGD.  Denies any new symptoms.     During his short hospital stay patient remained hemodynamically stable and no episodes of hematemesis or melena " observed.  He underwent EGD and was found to have nonerosive esophagitis as well as nonbleeding ulcers in the esophagus.  He was discharged home on a prescription of Protonix.  He was advised against smoking, alcohol as well as use of NSAIDs.  He was advised to follow up with GI to discuss biopsy results.     Review of Systems   Constitutional: Negative for activity change and appetite change.   HENT: Negative for congestion and sore throat.    Eyes: Negative for discharge and visual disturbance.   Respiratory: Negative for cough and chest tightness.    Cardiovascular: Negative for chest pain and leg swelling.   Gastrointestinal: Positive for nausea. Negative for abdominal pain.   Genitourinary: Negative for dysuria and hematuria.   Musculoskeletal: Negative for myalgias.   Skin: Negative for pallor and rash.   Neurological: Negative for dizziness and weakness.   Psychiatric/Behavioral: Negative for behavioral problems. The patient is not nervous/anxious.    All other systems reviewed and are negative.      Physical Exam   Constitutional: He is oriented to person, place, and time. He appears well-developed and well-nourished.   HENT:   Head: Atraumatic.   Mouth/Throat: Oropharynx is clear and moist.   Eyes: Pupils are equal, round, and reactive to light. EOM are normal.   Neck: Neck supple. No JVD present.   Cardiovascular: Normal rate, regular rhythm and normal heart sounds. Exam reveals no gallop.   No murmur heard.  Pulmonary/Chest: Breath sounds normal. No respiratory distress. He has no wheezes.   Abdominal: Soft. Bowel sounds are normal. He exhibits no distension. There is no rebound and no guarding.   Musculoskeletal: He exhibits no edema.   Lymphadenopathy:   He has no cervical adenopathy.   Neurological: He is alert and oriented to person, place, and time. No cranial nerve deficit.   Skin: Skin is warm. Capillary refill takes less than 2 seconds. No rash noted.   Psychiatric: His behavior is normal.      Nursing note and vitals reviewed.        Consults:   Consults (From admission, onward)        Status Ordering Provider     Inpatient consult to Gastroenterology  Once     Provider:  Kemar Pittman III, MD    Completed MOLLY COLLINS          No new Assessment & Plan notes have been filed under this hospital service since the last note was generated.  Service: Hospital Medicine    Final Active Diagnoses:      Problems Resolved During this Admission:    Diagnosis Date Noted Date Resolved POA    PRINCIPAL PROBLEM:  Acute upper GI bleed [K92.2] 11/18/2019 11/20/2019 Yes    Epigastric abdominal pain [R10.13] 11/19/2019 11/20/2019 Yes       Discharged Condition: good    Disposition: Home or Self Care    Follow Up:  Follow-up Information     Primary Doctor No In 1 week.           Kemar Pittman III, MD In 2 weeks.    Specialty:  Gastroenterology  Contact information:  [address] [facility] [address]  [phone]                 Patient Instructions:      Diet Adult Regular   Order Comments: Avoid spicy food and alcohol until cleared by MD     Activity as tolerated       Significant Diagnostic Studies: Labs:   BMP:   Recent Labs   Lab 11/19/19  0552   *      K 3.7      CO2 24   BUN 24*   CREATININE 1.1   CALCIUM 8.4*       Pending Diagnostic Studies:     Procedure Component Value Units Date/Time    Specimen to Pathology - Surgery [[id number]] Collected:  11/19/19 1556    Order Status:  Sent Lab Status:  No result     Specimen:  Tissue     WBC, Stool [[id number]] Collected:  11/19/19 0608    Order Status:  Sent Lab Status:  In process Updated:  11/19/19 0609    Specimen:  Stool          Medications:  Reconciled Home Medications:      Medication List      START taking these medications    pantoprazole 40 MG tablet  Commonly known as:  PROTONIX  Take 1 tablet (40 mg total) by mouth once daily.        CONTINUE taking these medications    cetirizine 10 MG tablet  Commonly known as:   ZYRTEC  Take 1 tablet (10 mg total) by mouth once daily.     famotidine 20 MG tablet  Commonly known as:  PEPCID  Take 1 tablet (20 mg total) by mouth 2 (two) times daily.     fluticasone propionate 50 mcg/actuation nasal spray  Commonly known as:  FLONASE  1 spray (50 mcg total) by Each Nare route 2 (two) times daily as needed.     ondansetron 4 MG Tbdl  Commonly known as:  ZOFRAN-ODT  Take 1 tablet (4 mg total) by mouth every 6 (six) hours as needed.            Indwelling Lines/Drains at time of discharge:   Lines/Drains/Airways     None                 Time spent on the discharge of patient: 28 minutes  Patient was seen and examined on the date of discharge and determined to be suitable for discharge.         Paresh Sanchez MD  Department of Hospital Medicine  [facility] hospital

## 2019-11-22 LAB — O+P STL TRI STN: NORMAL

## 2019-11-24 LAB
BACTERIA BLD CULT: NORMAL
BACTERIA BLD CULT: NORMAL

## 2020-03-02 ENCOUNTER — HOSPITAL ENCOUNTER (EMERGENCY)
Facility: OTHER | Age: 25
Discharge: HOME | End: 2020-03-02
Attending: EMERGENCY MEDICINE
Payer: MEDICAID

## 2020-03-02 VITALS
HEIGHT: 71 IN | OXYGEN SATURATION: 98 % | SYSTOLIC BLOOD PRESSURE: 145 MMHG | DIASTOLIC BLOOD PRESSURE: 65 MMHG | WEIGHT: 210 LBS | RESPIRATION RATE: 16 BRPM | HEART RATE: 76 BPM | TEMPERATURE: 99 F | BODY MASS INDEX: 29.4 KG/M2

## 2020-03-02 DIAGNOSIS — R05.9 COUGH: ICD-10-CM

## 2020-03-02 DIAGNOSIS — R09.81 CONGESTION OF NASAL SINUS: ICD-10-CM

## 2020-03-02 DIAGNOSIS — R50.9 FEVER, UNSPECIFIED FEVER CAUSE: ICD-10-CM

## 2020-03-02 DIAGNOSIS — J06.9 VIRAL URI WITH COUGH: Primary | ICD-10-CM

## 2020-03-02 LAB
CTP QC/QA: YES
POC MOLECULAR INFLUENZA A AGN: NEGATIVE
POC MOLECULAR INFLUENZA B AGN: NEGATIVE

## 2020-03-02 PROCEDURE — 99284 EMERGENCY DEPT VISIT MOD MDM: CPT | Mod: 25

## 2020-03-02 RX ORDER — FLUTICASONE PROPIONATE 50 MCG
1 SPRAY, SUSPENSION (ML) NASAL 2 TIMES DAILY PRN
Qty: 15 G | Refills: 0 | Status: SHIPPED | OUTPATIENT
Start: 2020-03-02

## 2020-03-02 RX ORDER — CETIRIZINE HYDROCHLORIDE 10 MG/1
10 TABLET ORAL DAILY
Qty: 30 TABLET | Refills: 0 | COMMUNITY
Start: 2020-03-02 | End: 2021-03-02

## 2020-03-03 NOTE — ED PROVIDER NOTES
Encounter Date: 3/2/2020       History     Chief Complaint   Patient presents with    Cough     pt c/o chest congestion, frequent cough, and fevers for 2 days      25-year-old male with presents the emergency room with chest congestion, cough and subjective fevers for the past 2 days.  He has not taken any medication to help alleviate his symptoms. He denies any chest pain, shortness of breath, headaches or abdominal pain. Patient denies recent travel.     The history is provided by the patient.     Review of patient's allergies indicates:  No Known Allergies  History reviewed. No pertinent past medical history.  Past Surgical History:   Procedure Laterality Date    ESOPHAGOGASTRODUODENOSCOPY N/A 11/19/2019    Procedure: EGD (ESOPHAGOGASTRODUODENOSCOPY);  Surgeon: Kemar Pittman III, MD;  Location: [facility];  Service: Endoscopy;  Laterality: N/A;     Family History   Problem Relation Age of Onset    Arthritis Mother     Asthma Mother     Cancer Mother     Diabetes Mother     Hypertension Mother     Arthritis Father     Diabetes Father     Hypertension Father     Asthma Maternal Aunt     Cancer Maternal Aunt     Diabetes Maternal Aunt     Hypertension Maternal Aunt     Asthma Maternal Uncle     Diabetes Maternal Uncle     Hypertension Maternal Uncle     Hypertension Paternal Aunt     Hypertension Paternal Uncle     Arthritis Maternal Grandmother     Cancer Maternal Grandmother     Heart disease Maternal Grandmother     Hypertension Maternal Grandmother     Arthritis Maternal Grandfather     Heart disease Maternal Grandfather     Hypertension Maternal Grandfather     Arthritis Paternal Grandmother     Hypertension Paternal Grandmother     Arthritis Paternal Grandfather     Hypertension Paternal Grandfather      Social History     Tobacco Use    Smoking status: Never Smoker    Smokeless tobacco: Never Used   Substance Use Topics    Alcohol use: No     Frequency: Never     Comment:  social    Drug use: No     Review of Systems   Constitutional: Positive for fever (Subjective).   HENT: Positive for congestion. Negative for sore throat.    Respiratory: Positive for cough. Negative for shortness of breath.    Cardiovascular: Negative for chest pain.   Gastrointestinal: Negative for nausea.   Genitourinary: Negative for dysuria.   Musculoskeletal: Negative for back pain.   Skin: Negative for rash.   Neurological: Negative for weakness and headaches.   Hematological: Does not bruise/bleed easily.   All other systems reviewed and are negative.    Physical Exam     Initial Vitals [03/02/20 1759]   BP Pulse Resp Temp SpO2   137/69 91 18 98.7 °F (37.1 °C) 98 %      MAP       --         Physical Exam    Nursing note and vitals reviewed.  Constitutional: He appears well-developed and well-nourished.   HENT:   Head: Normocephalic and atraumatic.   Right Ear: Hearing, external ear and ear canal normal. Tympanic membrane is not injected. A middle ear effusion (Clear) is present.   Left Ear: Hearing, external ear and ear canal normal. Tympanic membrane is not injected. A middle ear effusion ( clear) is present.   Swollen turbinates noted to bilateral nares and cobblestone appearance to posterior oropharynx   Eyes: Conjunctivae and EOM are normal. Pupils are equal, round, and reactive to light.   Neck: Normal range of motion.   Cardiovascular: Normal rate, regular rhythm, normal heart sounds and intact distal pulses. Exam reveals no gallop and no friction rub.    No murmur heard.  Pulmonary/Chest: Breath sounds normal. No respiratory distress. He has no wheezes. He has no rhonchi. He has no rales. He exhibits no tenderness.   Musculoskeletal: Normal range of motion. He exhibits no edema or tenderness.   Lymphadenopathy:     He has no cervical adenopathy.   Neurological: He is alert and oriented to person, place, and time. He has normal strength. GCS score is 15. GCS eye subscore is 4. GCS verbal subscore is  5. GCS motor subscore is 6.   Skin: Skin is warm. Capillary refill takes less than 2 seconds.       ED Course   Procedures  Labs Reviewed   POCT INFLUENZA A/B MOLECULAR          Imaging Results          X-Ray Chest PA And Lateral (Final result)  Result time 03/02/20 19:18:39    Final result by Edmund Crawford MD (03/02/20 19:18:39)                 Impression:      Normal radiographic appearance of the chest.      Electronically signed by: Edmund Crawford MD  Date:    03/02/2020  Time:    19:18             Narrative:    EXAMINATION:  XR CHEST PA AND LATERAL    CLINICAL HISTORY:  Cough    TECHNIQUE:  PA and lateral views of the chest were performed.    COMPARISON:  Chest radiograph 05/01/2019    FINDINGS:  No detrimental change.The lungs are clear, with normal appearance of pulmonary vasculature and no pleural effusion or pneumothorax.    The cardiac silhouette is normal in size. The hilar and mediastinal contours are unremarkable.    Bones are intact.                                 Medical Decision Making:   Initial Assessment:   25-year-old male with presents the emergency room with chest congestion, cough and subjective fevers for the past 2 days.  He has not taken any medication to help alleviate his symptoms. He denies any chest pain, shortness of breath, headaches or abdominal pain. Patient denies recent travel.   Differential Diagnosis:   Viral URI, pneumonia, bronchitis, allergic rhinitis, influenza  Clinical Tests:   Lab Tests: Ordered and Reviewed       <> Summary of Lab: Influenza negative  Radiological Study: Ordered and Reviewed  ED Management:  Patient examined and has an exam consistent with a viral URI.  Influenza negative. Chest x-ray negative for acute process.  Patient discharged with Flonase and Zyrtec and advised to follow up if he continues to have symptoms or if he runs fever more than 3 days. Patient given strict return precautions and voiced understanding of all discharge instructions. Pt was  stable at discharge.                        ED Course as of Mar 02 1931   Mon Mar 02, 2020   1838 BP: 137/69 [AT]   1838 Temp: 98.7 °F (37.1 °C) [AT]   1838 Temp src: Oral [AT]   1838 Pulse: 91 [AT]   1838 Resp: 18 [AT]   1838 SpO2: 98 % [AT]   1931 POC Molecular Influenza A Ag: Negative [AT]   1931 POC Molecular Influenza B Ag: Negative [AT]      ED Course User Index  [AT] WILL Newman            Clinical Impression:       ICD-10-CM ICD-9-CM   1. Viral URI with cough J06.9 465.9    B97.89    2. Cough R05 786.2   3. Fever, unspecified fever cause R50.9 780.60   4. Congestion of nasal sinus R09.81 478.19                                WILL Newman  03/03/20 1225

## 2020-03-03 NOTE — DISCHARGE INSTRUCTIONS
Please buy a thermometer and check your temperature if you feel you have fever  You can take any over the counter cold medication, tylenol or ibuprofen along with prescribed medication.

## 2020-07-20 ENCOUNTER — HOSPITAL ENCOUNTER (EMERGENCY)
Facility: OTHER | Age: 25
Discharge: HOME | End: 2020-07-20
Attending: EMERGENCY MEDICINE
Payer: MEDICAID

## 2020-07-20 VITALS
WEIGHT: 210 LBS | SYSTOLIC BLOOD PRESSURE: 143 MMHG | RESPIRATION RATE: 18 BRPM | OXYGEN SATURATION: 98 % | TEMPERATURE: 98 F | HEART RATE: 76 BPM | DIASTOLIC BLOOD PRESSURE: 78 MMHG | HEIGHT: 71 IN | BODY MASS INDEX: 29.4 KG/M2

## 2020-07-20 DIAGNOSIS — S93.491A SPRAIN OF ANTERIOR TALOFIBULAR LIGAMENT OF RIGHT ANKLE, INITIAL ENCOUNTER: Primary | ICD-10-CM

## 2020-07-20 DIAGNOSIS — M25.571 ACUTE RIGHT ANKLE PAIN: ICD-10-CM

## 2020-07-20 PROCEDURE — 99283 EMERGENCY DEPT VISIT LOW MDM: CPT | Mod: 25

## 2020-07-20 PROCEDURE — 25000003 PHARM REV CODE 250: Performed by: EMERGENCY MEDICINE

## 2020-07-20 RX ORDER — IBUPROFEN 600 MG/1
600 TABLET ORAL EVERY 6 HOURS PRN
Qty: 20 TABLET | Refills: 0 | OUTPATIENT
Start: 2020-07-20 | End: 2020-10-15

## 2020-07-20 RX ORDER — IBUPROFEN 600 MG/1
600 TABLET ORAL
Status: COMPLETED | OUTPATIENT
Start: 2020-07-20 | End: 2020-07-20

## 2020-07-20 RX ADMIN — IBUPROFEN 600 MG: 600 TABLET, FILM COATED ORAL at 04:07

## 2020-07-20 NOTE — Clinical Note
Clinton Brooks was seen and treated in our emergency department on 7/20/2020.  He may return to work on 07/27/2020.       If you have any questions or concerns, please don't hesitate to call.      Julieta Dick MD

## 2020-07-20 NOTE — PROVIDER PROGRESS NOTES - EMERGENCY DEPT.
Emergency Department TeleTRIAGE Encounter Note      CHIEF COMPLAINT    Chief Complaint   Patient presents with    Ankle Injury     R ankle injury. Pt fell while trying to catch the bus. Difficult to bear weight.       VITAL SIGNS   Initial Vitals [07/20/20 1444]   BP Pulse Resp Temp SpO2   (!) 143/78 76 18 97.9 °F (36.6 °C) 98 %      MAP       --            ALLERGIES    Review of patient's allergies indicates:  No Known Allergies    PROVIDER TRIAGE NOTE   Patient twisted his ankle try to catch the bus today around 10:00 a.m..  He has pain in his right ankle on the inside of the ankle.  I have ordered an ankle x-ray but will defer any additional treatment or investigation to my colleague that evaluate the patient face-to-face emergency department.      ORDERS  Labs Reviewed - No data to display    ED Orders (720h ago, onward)    Start Ordered     Status Ordering Provider    07/20/20 1508 07/20/20 1507  Ice to affected area  Once      Ordered BILLY OSEI    07/20/20 1507 07/20/20 1506  X-Ray Ankle Complete Right  1 time imaging      Ordered BILLY OSEI            Virtual Visit Note: The provider triage portion of this emergency department evaluation and documentation was performed via [employer], a HIPAA-compliant telemedicine application, in concert with a tele-presenter in the room. A face to face patient evaluation with one of my colleagues will occur once the patient is placed in an emergency department room.      DISCLAIMER: This note was prepared with Meg[employer] voice recognition transcription software. Garbled syntax, mangled pronouns, and other bizarre constructions may be attributed to that software system.

## 2020-07-20 NOTE — ED PROVIDER NOTES
Encounter Date: 7/20/2020       History     Chief Complaint   Patient presents with    Ankle Injury     R ankle injury. Pt fell while trying to catch the bus. Difficult to bear weight.     Patient states he was running to catch the bus and he twisted his right ankle.  Patient reports he did not fall, but caught himself with the same foot and took several extra steps.  Feels tingling of foot/ankle.  Denies other injury.  Teletriage xray ordered, reviewed, negative.     The history is provided by the patient.     Review of patient's allergies indicates:  No Known Allergies  No past medical history on file.  Past Surgical History:   Procedure Laterality Date    ESOPHAGOGASTRODUODENOSCOPY N/A 11/19/2019    Procedure: EGD (ESOPHAGOGASTRODUODENOSCOPY);  Surgeon: Kemar Pittman III, MD;  Location: [facility];  Service: Endoscopy;  Laterality: N/A;     Family History   Problem Relation Age of Onset    Arthritis Mother     Asthma Mother     Cancer Mother     Diabetes Mother     Hypertension Mother     Arthritis Father     Diabetes Father     Hypertension Father     Asthma Maternal Aunt     Cancer Maternal Aunt     Diabetes Maternal Aunt     Hypertension Maternal Aunt     Asthma Maternal Uncle     Diabetes Maternal Uncle     Hypertension Maternal Uncle     Hypertension Paternal Aunt     Hypertension Paternal Uncle     Arthritis Maternal Grandmother     Cancer Maternal Grandmother     Heart disease Maternal Grandmother     Hypertension Maternal Grandmother     Arthritis Maternal Grandfather     Heart disease Maternal Grandfather     Hypertension Maternal Grandfather     Arthritis Paternal Grandmother     Hypertension Paternal Grandmother     Arthritis Paternal Grandfather     Hypertension Paternal Grandfather      Social History     Tobacco Use    Smoking status: Never Smoker    Smokeless tobacco: Never Used   Substance Use Topics    Alcohol use: No     Frequency: Never     Comment: social     Drug use: No     Review of Systems   Constitutional: Positive for activity change.        Limited due to pain   Musculoskeletal: Positive for gait problem and joint swelling. Negative for back pain.        Ankle swelling/pain   Skin: Negative for wound.   Neurological: Positive for numbness. Negative for weakness.   Hematological: Does not bruise/bleed easily.   All other systems reviewed and are negative.      Physical Exam     Initial Vitals [07/20/20 1444]   BP Pulse Resp Temp SpO2   (!) 143/78 76 18 97.9 °F (36.6 °C) 98 %      MAP       --         Physical Exam    Nursing note and vitals reviewed.  Constitutional: He appears well-developed and well-nourished.   HENT:   Head: Normocephalic and atraumatic.   Eyes: Conjunctivae and EOM are normal.   Cardiovascular: Normal rate and intact distal pulses.   Pulmonary/Chest: No respiratory distress.   Abdominal: He exhibits no distension.   Musculoskeletal:        Right ankle: He exhibits decreased range of motion and swelling. He exhibits no deformity, no laceration and normal pulse. Tenderness. Lateral malleolus and AITFL tenderness found. No medial malleolus, no CF ligament, no posterior TFL, no head of 5th metatarsal and no proximal fibula tenderness found. Achilles tendon exhibits pain. Achilles tendon exhibits no defect and normal Batres's test results.   Neurological: He is alert and oriented to person, place, and time. He has normal strength. No cranial nerve deficit. GCS score is 15. GCS eye subscore is 4. GCS verbal subscore is 5. GCS motor subscore is 6.   Skin: Skin is warm and dry. Capillary refill takes less than 2 seconds.   Psychiatric: He has a normal mood and affect.         ED Course   Procedures  Labs Reviewed - No data to display       Imaging Results          X-Ray Ankle Complete Right (Final result)  Result time 07/20/20 15:49:30    Final result by Edmund Crawford MD (07/20/20 15:49:30)                 Impression:      Lateral right ankle  suspected mild nonspecific soft tissue swelling, without acute displaced fracture-dislocation identified.      Electronically signed by: Edmund Crawford MD  Date:    07/20/2020  Time:    15:49             Narrative:    EXAMINATION:  XR ANKLE COMPLETE 3 VIEW RIGHT    CLINICAL HISTORY:  Pain in right ankle and joints of right foot    TECHNIQUE:  AP, lateral, and oblique images of the right ankle were performed.    COMPARISON:  None    FINDINGS:  Bones are well mineralized. Overall alignment is within normal limits.  Mild prominence of the soft tissues about the ankle, greatest laterally suggesting nonspecific swelling from localized edema/contusion in the setting of trauma or cellulitis.  Ankle mortise is otherwise well aligned and intact.  No displaced fracture, dislocation or destructive osseous process. Joint spaces appear relatively maintained. No subcutaneous emphysema or radiodense retained foreign body.                              X-Rays:   Independently Interpreted Readings:   Other Readings:  Xray right ankle - soft tissue swelling, no fracture, no dislocation, no foreign body     Medical Decision Making:   Initial Assessment:   25-year-old male with inversion injury of right ankle with subsequent swelling and lateral malleolar swelling and tenderness.  Plan x-ray, NSAIDs, ice, elevation.  Differential Diagnosis:   Ankle sprain, fracture of foot or ankle, cellulitis, referred pain from knee or hip, contusion, abrasion, DVT    Clinical Tests:   Radiological Study: Ordered and Reviewed                                 Clinical Impression:       ICD-10-CM ICD-9-CM   1. Sprain of anterior talofibular ligament of right ankle, initial encounter  S93.491A 845.09   2. Acute right ankle pain  M25.571 719.47     338.19         Disposition:   Disposition: Discharged  Condition: Stable                        Julieta Dick MD  07/21/20 1611

## 2020-10-15 ENCOUNTER — HOSPITAL ENCOUNTER (EMERGENCY)
Facility: OTHER | Age: 25
Discharge: HOME | End: 2020-10-15
Attending: EMERGENCY MEDICINE
Payer: MEDICAID

## 2020-10-15 VITALS
TEMPERATURE: 98 F | SYSTOLIC BLOOD PRESSURE: 111 MMHG | HEART RATE: 78 BPM | WEIGHT: 230 LBS | HEIGHT: 70 IN | RESPIRATION RATE: 14 BRPM | OXYGEN SATURATION: 98 % | DIASTOLIC BLOOD PRESSURE: 59 MMHG | BODY MASS INDEX: 32.93 KG/M2

## 2020-10-15 DIAGNOSIS — K29.00 ACUTE GASTRITIS WITHOUT HEMORRHAGE, UNSPECIFIED GASTRITIS TYPE: Primary | ICD-10-CM

## 2020-10-15 LAB
ALBUMIN SERPL BCP-MCNC: 4.3 G/DL (ref 3.5–5.2)
ALP SERPL-CCNC: 67 U/L (ref 55–135)
ALT SERPL W/O P-5'-P-CCNC: 19 U/L (ref 10–44)
ANION GAP SERPL CALC-SCNC: 14 MMOL/L (ref 8–16)
AST SERPL-CCNC: 26 U/L (ref 10–40)
BASOPHILS # BLD AUTO: 0.05 K/UL (ref 0–0.2)
BASOPHILS NFR BLD: 0.5 % (ref 0–1.9)
BILIRUB SERPL-MCNC: 0.2 MG/DL (ref 0.1–1)
BUN SERPL-MCNC: 19 MG/DL (ref 6–20)
CALCIUM SERPL-MCNC: 8.9 MG/DL (ref 8.7–10.5)
CHLORIDE SERPL-SCNC: 105 MMOL/L (ref 95–110)
CO2 SERPL-SCNC: 20 MMOL/L (ref 23–29)
CREAT SERPL-MCNC: 1.1 MG/DL (ref 0.5–1.4)
DIFFERENTIAL METHOD: ABNORMAL
EOSINOPHIL # BLD AUTO: 0.6 K/UL (ref 0–0.5)
EOSINOPHIL NFR BLD: 5.9 % (ref 0–8)
ERYTHROCYTE [DISTWIDTH] IN BLOOD BY AUTOMATED COUNT: 13.1 % (ref 11.5–14.5)
EST. GFR  (AFRICAN AMERICAN): >60 ML/MIN/1.73 M^2
EST. GFR  (NON AFRICAN AMERICAN): >60 ML/MIN/1.73 M^2
GLUCOSE SERPL-MCNC: 129 MG/DL (ref 70–110)
HCT VFR BLD AUTO: 44.7 % (ref 40–54)
HGB BLD-MCNC: 15 G/DL (ref 14–18)
IMM GRANULOCYTES # BLD AUTO: 0.03 K/UL (ref 0–0.04)
IMM GRANULOCYTES NFR BLD AUTO: 0.3 % (ref 0–0.5)
LIPASE SERPL-CCNC: 35 U/L (ref 4–60)
LYMPHOCYTES # BLD AUTO: 2.4 K/UL (ref 1–4.8)
LYMPHOCYTES NFR BLD: 22.1 % (ref 18–48)
MCH RBC QN AUTO: 26.9 PG (ref 27–31)
MCHC RBC AUTO-ENTMCNC: 33.6 G/DL (ref 32–36)
MCV RBC AUTO: 80 FL (ref 82–98)
MONOCYTES # BLD AUTO: 0.8 K/UL (ref 0.3–1)
MONOCYTES NFR BLD: 7.6 % (ref 4–15)
NEUTROPHILS # BLD AUTO: 6.9 K/UL (ref 1.8–7.7)
NEUTROPHILS NFR BLD: 63.6 % (ref 38–73)
NRBC BLD-RTO: 0 /100 WBC
PLATELET # BLD AUTO: 223 K/UL (ref 150–350)
PMV BLD AUTO: 11.4 FL (ref 9.2–12.9)
POTASSIUM SERPL-SCNC: 3.6 MMOL/L (ref 3.5–5.1)
PROT SERPL-MCNC: 7.5 G/DL (ref 6–8.4)
RBC # BLD AUTO: 5.58 M/UL (ref 4.6–6.2)
SODIUM SERPL-SCNC: 139 MMOL/L (ref 136–145)
WBC # BLD AUTO: 10.76 K/UL (ref 3.9–12.7)

## 2020-10-15 PROCEDURE — 80053 COMPREHEN METABOLIC PANEL: CPT

## 2020-10-15 PROCEDURE — C9113 INJ PANTOPRAZOLE SODIUM, VIA: HCPCS | Performed by: EMERGENCY MEDICINE

## 2020-10-15 PROCEDURE — 25000003 PHARM REV CODE 250: Performed by: EMERGENCY MEDICINE

## 2020-10-15 PROCEDURE — 63600175 PHARM REV CODE 636 W HCPCS: Performed by: EMERGENCY MEDICINE

## 2020-10-15 PROCEDURE — 96375 TX/PRO/DX INJ NEW DRUG ADDON: CPT

## 2020-10-15 PROCEDURE — 85025 COMPLETE CBC W/AUTO DIFF WBC: CPT

## 2020-10-15 PROCEDURE — 83690 ASSAY OF LIPASE: CPT

## 2020-10-15 PROCEDURE — 99284 EMERGENCY DEPT VISIT MOD MDM: CPT | Mod: 25

## 2020-10-15 PROCEDURE — 96374 THER/PROPH/DIAG INJ IV PUSH: CPT

## 2020-10-15 RX ORDER — ONDANSETRON 4 MG/1
4 TABLET, ORALLY DISINTEGRATING ORAL EVERY 6 HOURS PRN
Qty: 20 TABLET | Refills: 0 | Status: SHIPPED | OUTPATIENT
Start: 2020-10-15

## 2020-10-15 RX ORDER — PANTOPRAZOLE SODIUM 20 MG/1
40 TABLET, DELAYED RELEASE ORAL DAILY
Qty: 60 TABLET | Refills: 0 | Status: SHIPPED | OUTPATIENT
Start: 2020-10-15 | End: 2020-11-14

## 2020-10-15 RX ORDER — TRAMADOL HYDROCHLORIDE 50 MG/1
50 TABLET ORAL EVERY 6 HOURS PRN
Qty: 12 TABLET | Refills: 0 | Status: SHIPPED | OUTPATIENT
Start: 2020-10-15 | End: 2020-10-18

## 2020-10-15 RX ORDER — MORPHINE SULFATE 10 MG/ML
4 INJECTION INTRAMUSCULAR; INTRAVENOUS; SUBCUTANEOUS
Status: COMPLETED | OUTPATIENT
Start: 2020-10-15 | End: 2020-10-15

## 2020-10-15 RX ORDER — PANTOPRAZOLE SODIUM 40 MG/10ML
40 INJECTION, POWDER, LYOPHILIZED, FOR SOLUTION INTRAVENOUS
Status: COMPLETED | OUTPATIENT
Start: 2020-10-15 | End: 2020-10-15

## 2020-10-15 RX ORDER — ONDANSETRON 2 MG/ML
4 INJECTION INTRAMUSCULAR; INTRAVENOUS
Status: COMPLETED | OUTPATIENT
Start: 2020-10-15 | End: 2020-10-15

## 2020-10-15 RX ADMIN — ONDANSETRON 4 MG: 2 INJECTION INTRAMUSCULAR; INTRAVENOUS at 08:10

## 2020-10-15 RX ADMIN — PANTOPRAZOLE SODIUM 40 MG: 40 INJECTION, POWDER, FOR SOLUTION INTRAVENOUS at 08:10

## 2020-10-15 RX ADMIN — MORPHINE SULFATE 4 MG: 10 INJECTION INTRAVENOUS at 08:10

## 2020-10-15 RX ADMIN — ALUMINUM HYDROXIDE, MAGNESIUM HYDROXIDE, SIMETHICONE 30 ML: 400; 400; 40 SUSPENSION ORAL at 08:10

## 2020-10-15 NOTE — Clinical Note
"Clinton Cote" Cecilia was seen and treated in our emergency department on 10/15/2020.  He may return to work on 10/17/2020.       If you have any questions or concerns, please don't hesitate to call.      Torsten Reyes MD"

## 2020-10-16 NOTE — ED PROVIDER NOTES
Encounter Date: 10/15/2020    SCRIBE #1 NOTE: I, Kimani De La Cruz, am scribing for, and in the presence of, Dr. Reyes.       History     Chief Complaint   Patient presents with    Abdominal Pain     pt came to the ed tonight c.o. epigastric pain and burning x 1 week. pt at some food tonight that it made the pain worse. nauseaed no vomiting      Time seen by provider: 7:54 PM    This is a 25 y.o. male who presents with complaint of burning epigastric abdominal pain that radiates into his chest and back that began one week ago, but worsened tonight. The patient reports that he was eating fried orange chicken for dinner, when his pain became worse. After eating he began to experience headache, heartburn, abdominal distension, and diaphoresis. He tried drinking Mylanta with no relief of his symptoms. He denies taking any NSAIDS recently. Two years ago, he was diagnosed with an ulcer.     The history is provided by the patient.     Review of patient's allergies indicates:  No Known Allergies  History reviewed. No pertinent past medical history.  Past Surgical History:   Procedure Laterality Date    ESOPHAGOGASTRODUODENOSCOPY N/A 11/19/2019    Procedure: EGD (ESOPHAGOGASTRODUODENOSCOPY);  Surgeon: Kemar Pittman III, MD;  Location: [facility];  Service: Endoscopy;  Laterality: N/A;     Family History   Problem Relation Age of Onset    Arthritis Mother     Asthma Mother     Cancer Mother     Diabetes Mother     Hypertension Mother     Arthritis Father     Diabetes Father     Hypertension Father     Asthma Maternal Aunt     Cancer Maternal Aunt     Diabetes Maternal Aunt     Hypertension Maternal Aunt     Asthma Maternal Uncle     Diabetes Maternal Uncle     Hypertension Maternal Uncle     Hypertension Paternal Aunt     Hypertension Paternal Uncle     Arthritis Maternal Grandmother     Cancer Maternal Grandmother     Heart disease Maternal Grandmother     Hypertension Maternal Grandmother      Arthritis Maternal Grandfather     Heart disease Maternal Grandfather     Hypertension Maternal Grandfather     Arthritis Paternal Grandmother     Hypertension Paternal Grandmother     Arthritis Paternal Grandfather     Hypertension Paternal Grandfather      Social History     Tobacco Use    Smoking status: Never Smoker    Smokeless tobacco: Never Used   Substance Use Topics    Alcohol use: No     Frequency: Never     Comment: social    Drug use: No     Review of Systems   Constitutional: Positive for diaphoresis. Negative for fever.   HENT: Negative for sore throat.    Respiratory: Negative for shortness of breath.    Cardiovascular: Negative for chest pain.   Gastrointestinal: Positive for abdominal pain and nausea. Negative for vomiting.   Genitourinary: Negative for dysuria.   Musculoskeletal: Negative for back pain.   Skin: Negative for rash.   Neurological: Positive for headaches. Negative for weakness.   Hematological: Does not bruise/bleed easily.   All other systems reviewed and are negative.      Physical Exam     Initial Vitals [10/15/20 1947]   BP Pulse Resp Temp SpO2   131/67 89 18 98.3 °F (36.8 °C) 98 %      MAP       --         Physical Exam    Nursing note and vitals reviewed.  Constitutional: He appears well-developed and well-nourished. He is not diaphoretic. No distress.   HENT:   Head: Normocephalic and atraumatic.   Right Ear: External ear normal.   Left Ear: External ear normal.   Nose: Nose normal.   Eyes: Conjunctivae and EOM are normal. Pupils are equal, round, and reactive to light.   Neck: Normal range of motion. Neck supple. No tracheal deviation present. No JVD present.   Cardiovascular: Normal rate, regular rhythm, normal heart sounds and intact distal pulses. Exam reveals no gallop and no friction rub.    No murmur heard.  Pulmonary/Chest: Breath sounds normal. No respiratory distress. He has no wheezes. He has no rhonchi. He has no rales. He exhibits no tenderness.    Abdominal: Soft. Bowel sounds are normal. He exhibits no distension and no mass. There is abdominal tenderness. There is no rebound and no guarding.   Tenderness to the mid epigastrium.   Musculoskeletal: Normal range of motion. No tenderness or edema.   Neurological: He is alert and oriented to person, place, and time. He has normal strength.   Skin: Skin is warm. Capillary refill takes less than 2 seconds.   Psychiatric: He has a normal mood and affect. Thought content normal.         ED Course   Procedures  Labs Reviewed   CBC W/ AUTO DIFFERENTIAL - Abnormal; Notable for the following components:       Result Value    Mean Corpuscular Volume 80 (*)     Mean Corpuscular Hemoglobin 26.9 (*)     Eos # 0.6 (*)     All other components within normal limits   COMPREHENSIVE METABOLIC PANEL - Abnormal; Notable for the following components:    CO2 20 (*)     Glucose 129 (*)     All other components within normal limits   LIPASE          Imaging Results    None          Medical Decision Making:   History:   Old Medical Records: I decided to obtain old medical records.  Differential Diagnosis:   Urinary tract infection, acute pyelonephritis, testicular torsion,  epididymitis, acute prostatitis, urinary retention, ureterolithiais, AAA rupture / dissection, diverticulitis, colitis, inflammatory bowel disease, gastroenteritis, gastritis, ulcer, cholecystitis, gallstones, pancreatitis, ileus, small bowel obstruction, appendicitis, constipation, intestinal gas pain, GERD, intestinal spasm,  intrabominal hemorrhage, intrabominal thrombus, malignancy      Clinical Tests:   Lab Tests: Ordered and Reviewed  ED Management:  Patient's signs and symptoms have significantly improved after treatment in the emergency department.  His lipase, LFTs, white blood cell count and bilirubin are all negative, his abdominal pain is resolved, his repeat exam is negative and he is afebrile; therefore, I feel it is safe and appropriate at this time  for the patient to be discharged for follow up and re-evaluation as detailed in the discharge instructions. I have discussed the specifics of the workup with the patient/guardian and the patient/guardian has verbalized understanding of the details of the workup, the diagnosis, the treatment plan, and the need for outpatient follow-up. Although the patient has no emergent etiology, patient/guardian understands the ED visit today was primarily to address immediate concerns and to rule out emergent causes of the symptoms and this does not preclude the development of an emergent condition after discharge. The patient/guardian is comfortable going home.  I educated the patient/guardian on the warning signs and symptoms for which they must seek immediate medical attention. All questions addressed and patient/guardian were given discharge instructions and followup information.               Scribe Attestation:   Scribe #1: I performed the above scribed service and the documentation accurately describes the services I performed. I attest to the accuracy of the note.    Attending Attestation:           Physician Attestation for Scribe:  Physician Attestation Statement for Scribe #1: I, Dr. Reyes, reviewed documentation, as scribed by Kimani De La Cruz in my presence, and it is both accurate and complete.                 ED Course as of Oct 15 2250   Thu Oct 15, 2020   2233 Patient reports resolution of his symptoms.  We discussed diet and lifestyle modifications he needs to implement.  We also discussed need to follow up with GI.    [MA]      ED Course User Index  [MA] Torsten Reyes MD            Clinical Impression:     ICD-10-CM ICD-9-CM   1. Acute gastritis without hemorrhage, unspecified gastritis type  K29.00 535.00                          ED Disposition Condition    Discharge Stable        ED Prescriptions     Medication Sig Dispense Start Date End Date Auth. Provider    pantoprazole (PROTONIX) 20 MG tablet Take 2  tablets (40 mg total) by mouth once daily. 60 tablet 10/15/2020 11/14/2020 Torsten Reyes MD    ondansetron (ZOFRAN-ODT) 4 MG TbDL Take 1 tablet (4 mg total) by mouth every 6 (six) hours as needed. 20 tablet 10/15/2020  Torsten Reyes MD    traMADoL (ULTRAM) 50 mg tablet Take 1 tablet (50 mg total) by mouth every 6 (six) hours as needed for Pain. 12 tablet 10/15/2020 10/18/2020 Torsten Reyes MD        Follow-up Information     Follow up With Specialties Details Why Contact Info    [facility]-All Locations Gastroenterology Schedule an appointment as soon as possible for a visit  For follow-up and re-evaluation of abdominal pain [address]  [address]  [facility] [address]  [phone]                                         Torsten Reyes MD  10/15/20 4402

## 2020-10-16 NOTE — ED TRIAGE NOTES
Pt came to the ed tonight c.o. abdominal cramping, and bloating x couple hours. Pt having some severe gastritis x couple of weeks. Pt states it is getting worse and tonight he had a sudden onset of severe abdominal cramping causing him to have diaphoresis and severe nausea. Pt is aaox4 and in no acute distress. Pt is guarding his abdomen and states the burning is getting worse. Pt will be monitored

## 2021-02-03 ENCOUNTER — HOSPITAL ENCOUNTER (EMERGENCY)
Facility: OTHER | Age: 26
Discharge: HOME | End: 2021-02-03
Attending: EMERGENCY MEDICINE
Payer: MEDICAID

## 2021-02-03 VITALS
BODY MASS INDEX: 32 KG/M2 | OXYGEN SATURATION: 99 % | DIASTOLIC BLOOD PRESSURE: 69 MMHG | HEART RATE: 68 BPM | TEMPERATURE: 99 F | SYSTOLIC BLOOD PRESSURE: 123 MMHG | RESPIRATION RATE: 18 BRPM | WEIGHT: 223 LBS

## 2021-02-03 DIAGNOSIS — R07.9 CHEST PAIN: ICD-10-CM

## 2021-02-03 DIAGNOSIS — R05.9 COUGH: ICD-10-CM

## 2021-02-03 DIAGNOSIS — B34.9 ACUTE VIRAL SYNDROME: Primary | ICD-10-CM

## 2021-02-03 LAB
CTP QC/QA: YES
CTP QC/QA: YES
POC MOLECULAR INFLUENZA A AGN: NEGATIVE
POC MOLECULAR INFLUENZA B AGN: NEGATIVE
SARS-COV-2 RDRP RESP QL NAA+PROBE: NEGATIVE

## 2021-02-03 PROCEDURE — 93005 ELECTROCARDIOGRAM TRACING: CPT

## 2021-02-03 PROCEDURE — 99284 EMERGENCY DEPT VISIT MOD MDM: CPT | Mod: 25

## 2021-02-03 PROCEDURE — 93010 EKG 12-LEAD: ICD-10-PCS | Mod: ,,, | Performed by: INTERNAL MEDICINE

## 2021-02-03 PROCEDURE — 93010 ELECTROCARDIOGRAM REPORT: CPT | Mod: ,,, | Performed by: INTERNAL MEDICINE

## 2021-02-03 PROCEDURE — U0002 COVID-19 LAB TEST NON-CDC: HCPCS | Performed by: EMERGENCY MEDICINE

## 2021-02-06 ENCOUNTER — HOSPITAL ENCOUNTER (EMERGENCY)
Facility: OTHER | Age: 26
Discharge: HOME | End: 2021-02-06
Attending: EMERGENCY MEDICINE
Payer: MEDICAID

## 2021-02-06 VITALS
SYSTOLIC BLOOD PRESSURE: 115 MMHG | RESPIRATION RATE: 18 BRPM | WEIGHT: 217 LBS | TEMPERATURE: 99 F | OXYGEN SATURATION: 98 % | BODY MASS INDEX: 31.07 KG/M2 | DIASTOLIC BLOOD PRESSURE: 80 MMHG | HEIGHT: 70 IN | HEART RATE: 78 BPM

## 2021-02-06 DIAGNOSIS — M79.10 MYALGIA: Primary | ICD-10-CM

## 2021-02-06 LAB
CTP QC/QA: YES
SARS-COV-2 RDRP RESP QL NAA+PROBE: NEGATIVE

## 2021-02-06 PROCEDURE — U0002 COVID-19 LAB TEST NON-CDC: HCPCS | Performed by: EMERGENCY MEDICINE

## 2021-02-06 PROCEDURE — 99283 EMERGENCY DEPT VISIT LOW MDM: CPT

## 2021-02-06 RX ORDER — IBUPROFEN 600 MG/1
600 TABLET ORAL EVERY 6 HOURS PRN
Qty: 20 TABLET | Refills: 0 | Status: SHIPPED | OUTPATIENT
Start: 2021-02-06

## 2021-04-11 ENCOUNTER — HOSPITAL ENCOUNTER (EMERGENCY)
Facility: OTHER | Age: 26
Discharge: HOME | End: 2021-04-11
Attending: EMERGENCY MEDICINE
Payer: MEDICAID

## 2021-04-11 VITALS
TEMPERATURE: 98 F | HEIGHT: 71 IN | DIASTOLIC BLOOD PRESSURE: 81 MMHG | WEIGHT: 215 LBS | BODY MASS INDEX: 30.1 KG/M2 | SYSTOLIC BLOOD PRESSURE: 164 MMHG | HEART RATE: 101 BPM | OXYGEN SATURATION: 100 % | RESPIRATION RATE: 18 BRPM

## 2021-04-11 DIAGNOSIS — S39.012A STRAIN OF LUMBAR REGION, INITIAL ENCOUNTER: Primary | ICD-10-CM

## 2021-04-11 LAB
BILIRUB UR QL STRIP: NEGATIVE
CLARITY UR: CLEAR
COLOR UR: YELLOW
GLUCOSE UR QL STRIP: NEGATIVE
HGB UR QL STRIP: NEGATIVE
KETONES UR QL STRIP: NEGATIVE
LEUKOCYTE ESTERASE UR QL STRIP: NEGATIVE
NITRITE UR QL STRIP: NEGATIVE
PH UR STRIP: 8 [PH] (ref 5–8)
PROT UR QL STRIP: NEGATIVE
SP GR UR STRIP: 1.02 (ref 1–1.03)
URN SPEC COLLECT METH UR: NORMAL
UROBILINOGEN UR STRIP-ACNC: NEGATIVE EU/DL

## 2021-04-11 PROCEDURE — 81003 URINALYSIS AUTO W/O SCOPE: CPT | Performed by: PHYSICIAN ASSISTANT

## 2021-04-11 PROCEDURE — 99284 EMERGENCY DEPT VISIT MOD MDM: CPT | Mod: 25

## 2021-04-11 PROCEDURE — 96372 THER/PROPH/DIAG INJ SC/IM: CPT

## 2021-04-11 PROCEDURE — 63600175 PHARM REV CODE 636 W HCPCS: Performed by: PHYSICIAN ASSISTANT

## 2021-04-11 RX ORDER — ORPHENADRINE CITRATE 30 MG/ML
60 INJECTION INTRAMUSCULAR; INTRAVENOUS
Status: COMPLETED | OUTPATIENT
Start: 2021-04-11 | End: 2021-04-11

## 2021-04-11 RX ORDER — KETOROLAC TROMETHAMINE 30 MG/ML
30 INJECTION, SOLUTION INTRAMUSCULAR; INTRAVENOUS
Status: COMPLETED | OUTPATIENT
Start: 2021-04-11 | End: 2021-04-11

## 2021-04-11 RX ORDER — METHOCARBAMOL 750 MG/1
1500 TABLET, FILM COATED ORAL 3 TIMES DAILY
Qty: 30 TABLET | Refills: 0 | Status: SHIPPED | OUTPATIENT
Start: 2021-04-11 | End: 2021-04-16

## 2021-04-11 RX ORDER — MELOXICAM 15 MG/1
15 TABLET ORAL DAILY
Qty: 12 TABLET | Refills: 0 | Status: SHIPPED | OUTPATIENT
Start: 2021-04-11

## 2021-04-11 RX ADMIN — KETOROLAC TROMETHAMINE 30 MG: 30 INJECTION, SOLUTION INTRAMUSCULAR; INTRAVENOUS at 12:04

## 2021-04-11 RX ADMIN — ORPHENADRINE CITRATE 60 MG: 30 INJECTION INTRAMUSCULAR; INTRAVENOUS at 12:04

## 2021-08-09 ENCOUNTER — HOSPITAL ENCOUNTER (EMERGENCY)
Facility: OTHER | Age: 26
Discharge: HOME | End: 2021-08-09
Attending: EMERGENCY MEDICINE
Payer: MEDICAID

## 2021-08-09 VITALS
SYSTOLIC BLOOD PRESSURE: 127 MMHG | WEIGHT: 198 LBS | HEIGHT: 71 IN | OXYGEN SATURATION: 97 % | HEART RATE: 84 BPM | DIASTOLIC BLOOD PRESSURE: 86 MMHG | BODY MASS INDEX: 27.72 KG/M2 | RESPIRATION RATE: 20 BRPM | TEMPERATURE: 98 F

## 2021-08-09 DIAGNOSIS — R51.9 NONINTRACTABLE HEADACHE, UNSPECIFIED CHRONICITY PATTERN, UNSPECIFIED HEADACHE TYPE: ICD-10-CM

## 2021-08-09 DIAGNOSIS — M79.10 MYALGIA: ICD-10-CM

## 2021-08-09 DIAGNOSIS — U07.1 COVID-19: Primary | ICD-10-CM

## 2021-08-09 DIAGNOSIS — U07.1 COVID-19 VIRUS DETECTED: ICD-10-CM

## 2021-08-09 LAB
CTP QC/QA: YES
SARS-COV-2 RDRP RESP QL NAA+PROBE: POSITIVE

## 2021-08-09 PROCEDURE — 99282 EMERGENCY DEPT VISIT SF MDM: CPT

## 2021-08-09 PROCEDURE — 99283 PR EMERGENCY DEPT VISIT,LEVEL III: ICD-10-PCS | Mod: CS,,, | Performed by: NURSE PRACTITIONER

## 2021-08-09 PROCEDURE — U0002 COVID-19 LAB TEST NON-CDC: HCPCS | Performed by: NURSE PRACTITIONER

## 2021-08-09 PROCEDURE — 99283 EMERGENCY DEPT VISIT LOW MDM: CPT | Mod: CS,,, | Performed by: NURSE PRACTITIONER

## 2021-08-10 ENCOUNTER — PES CALL (OUTPATIENT)
Dept: ADMINISTRATIVE | Facility: CLINIC | Age: 26
End: 2021-08-10

## 2021-08-11 ENCOUNTER — NURSE TRIAGE (OUTPATIENT)
Dept: ADMINISTRATIVE | Facility: CLINIC | Age: 26
End: 2021-08-11

## 2021-11-10 ENCOUNTER — HOSPITAL ENCOUNTER (EMERGENCY)
Facility: OTHER | Age: 26
Discharge: HOME | End: 2021-11-10
Attending: EMERGENCY MEDICINE
Payer: MEDICAID

## 2021-11-10 VITALS
WEIGHT: 198 LBS | HEIGHT: 71 IN | TEMPERATURE: 98 F | OXYGEN SATURATION: 98 % | BODY MASS INDEX: 27.72 KG/M2 | RESPIRATION RATE: 20 BRPM | SYSTOLIC BLOOD PRESSURE: 129 MMHG | DIASTOLIC BLOOD PRESSURE: 76 MMHG | HEART RATE: 98 BPM

## 2021-11-10 DIAGNOSIS — T14.8XXA PUNCTURE WOUND: Primary | ICD-10-CM

## 2021-11-10 PROCEDURE — 90471 IMMUNIZATION ADMIN: CPT | Performed by: PHYSICIAN ASSISTANT

## 2021-11-10 PROCEDURE — 90715 TDAP VACCINE 7 YRS/> IM: CPT | Performed by: PHYSICIAN ASSISTANT

## 2021-11-10 PROCEDURE — 99284 EMERGENCY DEPT VISIT MOD MDM: CPT | Mod: 25

## 2021-11-10 PROCEDURE — 63600175 PHARM REV CODE 636 W HCPCS: Performed by: PHYSICIAN ASSISTANT

## 2021-11-10 RX ORDER — CIPROFLOXACIN 500 MG/1
500 TABLET ORAL EVERY 12 HOURS
Qty: 14 TABLET | Refills: 0 | Status: SHIPPED | OUTPATIENT
Start: 2021-11-10 | End: 2021-11-17

## 2021-11-10 RX ORDER — MUPIROCIN 20 MG/G
OINTMENT TOPICAL 3 TIMES DAILY
Qty: 15 G | Refills: 0 | Status: SHIPPED | OUTPATIENT
Start: 2021-11-10

## 2021-11-10 RX ADMIN — CLOSTRIDIUM TETANI TOXOID ANTIGEN (FORMALDEHYDE INACTIVATED), CORYNEBACTERIUM DIPHTHERIAE TOXOID ANTIGEN (FORMALDEHYDE INACTIVATED), BORDETELLA PERTUSSIS TOXOID ANTIGEN (GLUTARALDEHYDE INACTIVATED), BORDETELLA PERTUSSIS FILAMENTOUS HEMAGGLUTININ ANTIGEN (FORMALDEHYDE INACTIVATED), BORDETELLA PERTUSSIS PERTACTIN ANTIGEN, AND BORDETELLA PERTUSSIS FIMBRIAE 2/3 ANTIGEN 0.5 ML: 5; 2; 2.5; 5; 3; 5 INJECTION, SUSPENSION INTRAMUSCULAR at 02:11

## 2022-10-25 ENCOUNTER — HOSPITAL ENCOUNTER (EMERGENCY)
Facility: OTHER | Age: 27
Discharge: HOME | End: 2022-10-26
Attending: EMERGENCY MEDICINE
Payer: MEDICAID

## 2022-10-25 DIAGNOSIS — R19.7 NAUSEA VOMITING AND DIARRHEA: Primary | ICD-10-CM

## 2022-10-25 DIAGNOSIS — R10.12 ACUTE BILATERAL UPPER ABDOMINAL PAIN: ICD-10-CM

## 2022-10-25 DIAGNOSIS — R10.11 ACUTE BILATERAL UPPER ABDOMINAL PAIN: ICD-10-CM

## 2022-10-25 DIAGNOSIS — R11.2 NAUSEA VOMITING AND DIARRHEA: Primary | ICD-10-CM

## 2022-10-25 LAB
ALBUMIN SERPL BCP-MCNC: 4.3 G/DL (ref 3.5–5.2)
ALP SERPL-CCNC: 71 U/L (ref 55–135)
ALT SERPL W/O P-5'-P-CCNC: 27 U/L (ref 10–44)
ANION GAP SERPL CALC-SCNC: 10 MMOL/L (ref 8–16)
AST SERPL-CCNC: 28 U/L (ref 10–40)
BASOPHILS # BLD AUTO: 0.02 K/UL (ref 0–0.2)
BASOPHILS NFR BLD: 0.2 % (ref 0–1.9)
BILIRUB SERPL-MCNC: 0.2 MG/DL (ref 0.1–1)
BUN SERPL-MCNC: 19 MG/DL (ref 6–20)
CALCIUM SERPL-MCNC: 9.3 MG/DL (ref 8.7–10.5)
CHLORIDE SERPL-SCNC: 104 MMOL/L (ref 95–110)
CO2 SERPL-SCNC: 24 MMOL/L (ref 23–29)
CREAT SERPL-MCNC: 1.1 MG/DL (ref 0.5–1.4)
DIFFERENTIAL METHOD: NORMAL
EOSINOPHIL # BLD AUTO: 0.2 K/UL (ref 0–0.5)
EOSINOPHIL NFR BLD: 1.8 % (ref 0–8)
ERYTHROCYTE [DISTWIDTH] IN BLOOD BY AUTOMATED COUNT: 13 % (ref 11.5–14.5)
EST. GFR  (NO RACE VARIABLE): >60 ML/MIN/1.73 M^2
GLUCOSE SERPL-MCNC: 110 MG/DL (ref 70–110)
HCT VFR BLD AUTO: 46 % (ref 40–54)
HCV AB SERPL QL IA: NEGATIVE
HGB BLD-MCNC: 15.2 G/DL (ref 14–18)
HIV 1+2 AB+HIV1 P24 AG SERPL QL IA: NEGATIVE
IMM GRANULOCYTES # BLD AUTO: 0.02 K/UL (ref 0–0.04)
IMM GRANULOCYTES NFR BLD AUTO: 0.2 % (ref 0–0.5)
LIPASE SERPL-CCNC: 23 U/L (ref 4–60)
LYMPHOCYTES # BLD AUTO: 2.3 K/UL (ref 1–4.8)
LYMPHOCYTES NFR BLD: 26.9 % (ref 18–48)
MCH RBC QN AUTO: 27.1 PG (ref 27–31)
MCHC RBC AUTO-ENTMCNC: 33 G/DL (ref 32–36)
MCV RBC AUTO: 82 FL (ref 82–98)
MONOCYTES # BLD AUTO: 0.7 K/UL (ref 0.3–1)
MONOCYTES NFR BLD: 7.7 % (ref 4–15)
NEUTROPHILS # BLD AUTO: 5.4 K/UL (ref 1.8–7.7)
NEUTROPHILS NFR BLD: 63.2 % (ref 38–73)
NRBC BLD-RTO: 0 /100 WBC
PLATELET # BLD AUTO: 243 K/UL (ref 150–450)
PMV BLD AUTO: 11.3 FL (ref 9.2–12.9)
POCT GLUCOSE: 110 MG/DL (ref 70–110)
POTASSIUM SERPL-SCNC: 4.2 MMOL/L (ref 3.5–5.1)
PROT SERPL-MCNC: 8 G/DL (ref 6–8.4)
RBC # BLD AUTO: 5.6 M/UL (ref 4.6–6.2)
SODIUM SERPL-SCNC: 138 MMOL/L (ref 136–145)
WBC # BLD AUTO: 8.54 K/UL (ref 3.9–12.7)

## 2022-10-25 PROCEDURE — 83690 ASSAY OF LIPASE: CPT | Performed by: NURSE PRACTITIONER

## 2022-10-25 PROCEDURE — 96374 THER/PROPH/DIAG INJ IV PUSH: CPT

## 2022-10-25 PROCEDURE — 25000003 PHARM REV CODE 250: Performed by: NURSE PRACTITIONER

## 2022-10-25 PROCEDURE — C9113 INJ PANTOPRAZOLE SODIUM, VIA: HCPCS | Performed by: EMERGENCY MEDICINE

## 2022-10-25 PROCEDURE — 99284 EMERGENCY DEPT VISIT MOD MDM: CPT | Mod: 25

## 2022-10-25 PROCEDURE — 87389 HIV-1 AG W/HIV-1&-2 AB AG IA: CPT | Performed by: EMERGENCY MEDICINE

## 2022-10-25 PROCEDURE — 25000003 PHARM REV CODE 250: Performed by: EMERGENCY MEDICINE

## 2022-10-25 PROCEDURE — 80053 COMPREHEN METABOLIC PANEL: CPT | Performed by: NURSE PRACTITIONER

## 2022-10-25 PROCEDURE — 82962 GLUCOSE BLOOD TEST: CPT

## 2022-10-25 PROCEDURE — 86803 HEPATITIS C AB TEST: CPT | Performed by: EMERGENCY MEDICINE

## 2022-10-25 PROCEDURE — 96375 TX/PRO/DX INJ NEW DRUG ADDON: CPT

## 2022-10-25 PROCEDURE — 63600175 PHARM REV CODE 636 W HCPCS: Performed by: EMERGENCY MEDICINE

## 2022-10-25 PROCEDURE — 85025 COMPLETE CBC W/AUTO DIFF WBC: CPT | Performed by: NURSE PRACTITIONER

## 2022-10-25 PROCEDURE — 96361 HYDRATE IV INFUSION ADD-ON: CPT

## 2022-10-25 RX ORDER — ONDANSETRON 8 MG/1
8 TABLET, ORALLY DISINTEGRATING ORAL EVERY 6 HOURS PRN
Qty: 15 TABLET | Refills: 0 | Status: SHIPPED | OUTPATIENT
Start: 2022-10-25

## 2022-10-25 RX ORDER — FAMOTIDINE 10 MG/ML
20 INJECTION INTRAVENOUS
Status: COMPLETED | OUTPATIENT
Start: 2022-10-25 | End: 2022-10-25

## 2022-10-25 RX ORDER — ONDANSETRON 4 MG/1
4 TABLET, ORALLY DISINTEGRATING ORAL
Status: COMPLETED | OUTPATIENT
Start: 2022-10-25 | End: 2022-10-25

## 2022-10-25 RX ORDER — FAMOTIDINE 20 MG/1
20 TABLET, FILM COATED ORAL 2 TIMES DAILY
Qty: 60 TABLET | Refills: 0 | Status: SHIPPED | OUTPATIENT
Start: 2022-10-25 | End: 2022-11-24

## 2022-10-25 RX ORDER — PANTOPRAZOLE SODIUM 40 MG/10ML
40 INJECTION, POWDER, LYOPHILIZED, FOR SOLUTION INTRAVENOUS
Status: COMPLETED | OUTPATIENT
Start: 2022-10-25 | End: 2022-10-25

## 2022-10-25 RX ORDER — MORPHINE SULFATE 2 MG/ML
2 INJECTION, SOLUTION INTRAMUSCULAR; INTRAVENOUS
Status: COMPLETED | OUTPATIENT
Start: 2022-10-25 | End: 2022-10-25

## 2022-10-25 RX ADMIN — FAMOTIDINE 20 MG: 10 INJECTION INTRAVENOUS at 09:10

## 2022-10-25 RX ADMIN — PANTOPRAZOLE SODIUM 40 MG: 40 INJECTION, POWDER, FOR SOLUTION INTRAVENOUS at 10:10

## 2022-10-25 RX ADMIN — ONDANSETRON 4 MG: 4 TABLET, ORALLY DISINTEGRATING ORAL at 09:10

## 2022-10-25 RX ADMIN — SODIUM CHLORIDE, SODIUM LACTATE, POTASSIUM CHLORIDE, AND CALCIUM CHLORIDE 1000 ML: .6; .31; .03; .02 INJECTION, SOLUTION INTRAVENOUS at 09:10

## 2022-10-25 RX ADMIN — MORPHINE SULFATE 2 MG: 2 INJECTION, SOLUTION INTRAMUSCULAR; INTRAVENOUS at 10:10

## 2022-10-26 ENCOUNTER — HOSPITAL ENCOUNTER (EMERGENCY)
Facility: OTHER | Age: 27
Discharge: HOME | End: 2022-10-27
Attending: EMERGENCY MEDICINE
Payer: MEDICAID

## 2022-10-26 VITALS
HEIGHT: 71 IN | RESPIRATION RATE: 18 BRPM | HEART RATE: 78 BPM | TEMPERATURE: 98 F | SYSTOLIC BLOOD PRESSURE: 122 MMHG | WEIGHT: 237 LBS | DIASTOLIC BLOOD PRESSURE: 87 MMHG | OXYGEN SATURATION: 100 % | BODY MASS INDEX: 33.18 KG/M2

## 2022-10-26 VITALS
SYSTOLIC BLOOD PRESSURE: 125 MMHG | TEMPERATURE: 98 F | HEART RATE: 79 BPM | OXYGEN SATURATION: 97 % | WEIGHT: 237 LBS | DIASTOLIC BLOOD PRESSURE: 63 MMHG | BODY MASS INDEX: 33.18 KG/M2 | HEIGHT: 71 IN | RESPIRATION RATE: 17 BRPM

## 2022-10-26 DIAGNOSIS — R11.2 NAUSEA AND VOMITING, UNSPECIFIED VOMITING TYPE: ICD-10-CM

## 2022-10-26 DIAGNOSIS — R10.9 ABDOMINAL PAIN, UNSPECIFIED ABDOMINAL LOCATION: Primary | ICD-10-CM

## 2022-10-26 LAB
ALBUMIN SERPL BCP-MCNC: 4.2 G/DL (ref 3.5–5.2)
ALP SERPL-CCNC: 65 U/L (ref 55–135)
ALT SERPL W/O P-5'-P-CCNC: 31 U/L (ref 10–44)
ANION GAP SERPL CALC-SCNC: 9 MMOL/L (ref 8–16)
AST SERPL-CCNC: 28 U/L (ref 10–40)
BASOPHILS # BLD AUTO: 0.02 K/UL (ref 0–0.2)
BASOPHILS NFR BLD: 0.2 % (ref 0–1.9)
BILIRUB SERPL-MCNC: 0.1 MG/DL (ref 0.1–1)
BILIRUB UR QL STRIP: NEGATIVE
BUN SERPL-MCNC: 19 MG/DL (ref 6–20)
CALCIUM SERPL-MCNC: 9.3 MG/DL (ref 8.7–10.5)
CHLORIDE SERPL-SCNC: 102 MMOL/L (ref 95–110)
CLARITY UR: CLEAR
CO2 SERPL-SCNC: 25 MMOL/L (ref 23–29)
COLOR UR: YELLOW
CREAT SERPL-MCNC: 1 MG/DL (ref 0.5–1.4)
CTP QC/QA: YES
DIFFERENTIAL METHOD: ABNORMAL
EOSINOPHIL # BLD AUTO: 0.1 K/UL (ref 0–0.5)
EOSINOPHIL NFR BLD: 1 % (ref 0–8)
ERYTHROCYTE [DISTWIDTH] IN BLOOD BY AUTOMATED COUNT: 12.9 % (ref 11.5–14.5)
EST. GFR  (NO RACE VARIABLE): >60 ML/MIN/1.73 M^2
GLUCOSE SERPL-MCNC: 125 MG/DL (ref 70–110)
GLUCOSE UR QL STRIP: NEGATIVE
HCT VFR BLD AUTO: 44.7 % (ref 40–54)
HGB BLD-MCNC: 15.1 G/DL (ref 14–18)
HGB UR QL STRIP: NEGATIVE
IMM GRANULOCYTES # BLD AUTO: 0.04 K/UL (ref 0–0.04)
IMM GRANULOCYTES NFR BLD AUTO: 0.4 % (ref 0–0.5)
KETONES UR QL STRIP: NEGATIVE
LEUKOCYTE ESTERASE UR QL STRIP: NEGATIVE
LIPASE SERPL-CCNC: 16 U/L (ref 4–60)
LYMPHOCYTES # BLD AUTO: 1.8 K/UL (ref 1–4.8)
LYMPHOCYTES NFR BLD: 17 % (ref 18–48)
MCH RBC QN AUTO: 27.3 PG (ref 27–31)
MCHC RBC AUTO-ENTMCNC: 33.8 G/DL (ref 32–36)
MCV RBC AUTO: 81 FL (ref 82–98)
MONOCYTES # BLD AUTO: 0.5 K/UL (ref 0.3–1)
MONOCYTES NFR BLD: 4.7 % (ref 4–15)
NEUTROPHILS # BLD AUTO: 8.2 K/UL (ref 1.8–7.7)
NEUTROPHILS NFR BLD: 76.7 % (ref 38–73)
NITRITE UR QL STRIP: NEGATIVE
NRBC BLD-RTO: 0 /100 WBC
PH UR STRIP: 7 [PH] (ref 5–8)
PLATELET # BLD AUTO: 216 K/UL (ref 150–450)
PMV BLD AUTO: 11.3 FL (ref 9.2–12.9)
POC MOLECULAR INFLUENZA A AGN: NEGATIVE
POC MOLECULAR INFLUENZA B AGN: NEGATIVE
POTASSIUM SERPL-SCNC: 4.2 MMOL/L (ref 3.5–5.1)
PROT SERPL-MCNC: 7.8 G/DL (ref 6–8.4)
PROT UR QL STRIP: NEGATIVE
RBC # BLD AUTO: 5.53 M/UL (ref 4.6–6.2)
SODIUM SERPL-SCNC: 136 MMOL/L (ref 136–145)
SP GR UR STRIP: 1.01 (ref 1–1.03)
URN SPEC COLLECT METH UR: NORMAL
UROBILINOGEN UR STRIP-ACNC: NEGATIVE EU/DL
WBC # BLD AUTO: 10.7 K/UL (ref 3.9–12.7)

## 2022-10-26 PROCEDURE — 96361 HYDRATE IV INFUSION ADD-ON: CPT

## 2022-10-26 PROCEDURE — 25000003 PHARM REV CODE 250: Performed by: PHYSICIAN ASSISTANT

## 2022-10-26 PROCEDURE — 96365 THER/PROPH/DIAG IV INF INIT: CPT

## 2022-10-26 PROCEDURE — 96375 TX/PRO/DX INJ NEW DRUG ADDON: CPT

## 2022-10-26 PROCEDURE — 80053 COMPREHEN METABOLIC PANEL: CPT | Performed by: PHYSICIAN ASSISTANT

## 2022-10-26 PROCEDURE — 25500020 PHARM REV CODE 255: Performed by: EMERGENCY MEDICINE

## 2022-10-26 PROCEDURE — 85025 COMPLETE CBC W/AUTO DIFF WBC: CPT | Performed by: PHYSICIAN ASSISTANT

## 2022-10-26 PROCEDURE — 63600175 PHARM REV CODE 636 W HCPCS: Performed by: PHYSICIAN ASSISTANT

## 2022-10-26 PROCEDURE — 99285 EMERGENCY DEPT VISIT HI MDM: CPT | Mod: 25

## 2022-10-26 PROCEDURE — 83690 ASSAY OF LIPASE: CPT | Performed by: PHYSICIAN ASSISTANT

## 2022-10-26 PROCEDURE — 81003 URINALYSIS AUTO W/O SCOPE: CPT | Performed by: PHYSICIAN ASSISTANT

## 2022-10-26 RX ORDER — PROMETHAZINE HYDROCHLORIDE 25 MG/ML
12.5 INJECTION, SOLUTION INTRAMUSCULAR; INTRAVENOUS
Status: DISCONTINUED | OUTPATIENT
Start: 2022-10-26 | End: 2022-10-26

## 2022-10-26 RX ORDER — MORPHINE SULFATE 4 MG/ML
4 INJECTION, SOLUTION INTRAMUSCULAR; INTRAVENOUS
Status: COMPLETED | OUTPATIENT
Start: 2022-10-26 | End: 2022-10-26

## 2022-10-26 RX ORDER — HYOSCYAMINE SULFATE 0.12 MG/1
0.12 TABLET SUBLINGUAL
Status: DISCONTINUED | OUTPATIENT
Start: 2022-10-26 | End: 2022-10-26

## 2022-10-26 RX ADMIN — PROMETHAZINE HYDROCHLORIDE 25 MG: 25 INJECTION INTRAMUSCULAR; INTRAVENOUS at 08:10

## 2022-10-26 RX ADMIN — IOHEXOL 100 ML: 350 INJECTION, SOLUTION INTRAVENOUS at 10:10

## 2022-10-26 RX ADMIN — MORPHINE SULFATE 4 MG: 4 INJECTION, SOLUTION INTRAMUSCULAR; INTRAVENOUS at 08:10

## 2022-10-26 RX ADMIN — SODIUM CHLORIDE 1000 ML: 0.9 INJECTION, SOLUTION INTRAVENOUS at 08:10

## 2022-10-26 NOTE — ED PROVIDER NOTES
"Encounter Date: 10/25/2022    SCRIBE #1 NOTE: I Yanelis Li, andrés scribing for, and in the presence of,  Faustina Vincent MD.     History     Chief Complaint   Patient presents with    Vomiting     Reports abd pain with N/V/D, pain starts at RUQ radiates to LUQ, onset this am, Hx H. Pylori and gastric ulcers. Abd tender to touch, unable to tolerate oral intake.      28 yo M with history of abdominal ulcers, presents to the ED with abdominal pain. He reports this AM, he started experiencing upper abdominal pain that radiates down to his left lower quadrant abdomen since this AM, worsening since onset. He states his pain initially started as "hunger pains", which then progresses to a severe, burning pain to his abdomen after consumption of food. He reports hx of similar sx (states this happens annually), and previously had an EGD done that confirmed ulcers. He further notes hx of H.pylori and was last treated 5 mo with resolution of symptoms. He further reports nausea and non-bloody emesis (x7 episodes) today, as well as dizziness. He has been unable to tolerate PO today. He states his last bowel movement occurred earlier today which was non-bloody diarrhea. No other exacerbating or alleviating factors. Denies fever, or other associated symptoms. No attempted Tx today. No other exacerbating or alleviating factors. No hx of pancreatitis. No hx of abdominal surgeries.     The history is provided by the patient.   Review of patient's allergies indicates:  No Known Allergies  History reviewed. No pertinent past medical history.  Past Surgical History:   Procedure Laterality Date    ESOPHAGOGASTRODUODENOSCOPY N/A 11/19/2019    Procedure: EGD (ESOPHAGOGASTRODUODENOSCOPY);  Surgeon: Kemar Pittman III, MD;  Location: [facility];  Service: Endoscopy;  Laterality: N/A;     Family History   Problem Relation Age of Onset    Arthritis Mother     Asthma Mother     Cancer Mother     Diabetes Mother     Hypertension Mother     " Arthritis Father     Diabetes Father     Hypertension Father     Asthma Maternal Aunt     Cancer Maternal Aunt     Diabetes Maternal Aunt     Hypertension Maternal Aunt     Asthma Maternal Uncle     Diabetes Maternal Uncle     Hypertension Maternal Uncle     Hypertension Paternal Aunt     Hypertension Paternal Uncle     Arthritis Maternal Grandmother     Cancer Maternal Grandmother     Heart disease Maternal Grandmother     Hypertension Maternal Grandmother     Arthritis Maternal Grandfather     Heart disease Maternal Grandfather     Hypertension Maternal Grandfather     Arthritis Paternal Grandmother     Hypertension Paternal Grandmother     Arthritis Paternal Grandfather     Hypertension Paternal Grandfather      Social History     Tobacco Use    Smoking status: Never    Smokeless tobacco: Never   Substance Use Topics    Alcohol use: No     Comment: social    Drug use: No     Review of Systems   Constitutional:  Negative for chills and fever.   HENT:  Negative for congestion, rhinorrhea and sore throat.    Eyes:  Negative for visual disturbance.   Respiratory:  Negative for cough and shortness of breath.    Cardiovascular:  Negative for chest pain.   Gastrointestinal:  Positive for abdominal pain, diarrhea, nausea and vomiting. Negative for blood in stool.   Genitourinary:  Negative for dysuria, frequency and hematuria.   Musculoskeletal:  Negative for back pain.   Skin:  Negative for rash.   Neurological:  Positive for dizziness. Negative for weakness and headaches.     Physical Exam     Initial Vitals [10/25/22 2019]   BP Pulse Resp Temp SpO2   137/83 88 17 99.8 °F (37.7 °C) 100 %      MAP       --         Physical Exam    Nursing note and vitals reviewed.  Constitutional: He appears well-developed and well-nourished. He does not have a sickly appearance. No distress.   HENT:   Head: Normocephalic and atraumatic.   Right Ear: External ear normal.   Left Ear: External ear normal.   Eyes: Conjunctivae, EOM and  lids are normal. Right eye exhibits no discharge. Left eye exhibits no discharge. Right conjunctiva is not injected. Right conjunctiva has no hemorrhage. Left conjunctiva is not injected. Left conjunctiva has no hemorrhage. No scleral icterus.   Neck: Phonation normal. No stridor present. No tracheal deviation present.   Normal range of motion.  Cardiovascular:  Normal rate, regular rhythm and normal heart sounds.     Exam reveals no friction rub.       No murmur heard.  Pulses:       Radial pulses are 2+ on the right side and 2+ on the left side.        Dorsalis pedis pulses are 2+ on the right side and 2+ on the left side.   Pulmonary/Chest: Breath sounds normal. No respiratory distress. He has no wheezes.   Abdominal: Abdomen is soft. There is abdominal tenderness.   Diffuse abdominal tenderness, greatest in the epigastric region.  There is no rebound and no guarding.   Musculoskeletal:      Cervical back: Normal range of motion.     Neurological: He is alert and oriented to person, place, and time. He has normal strength. GCS eye subscore is 4. GCS verbal subscore is 5. GCS motor subscore is 6.   Skin: Skin is warm.   Psychiatric: He has a normal mood and affect. His speech is normal and behavior is normal. Judgment and thought content normal. Cognition and memory are normal.       ED Course   Procedures  Labs Reviewed   HIV 1 / 2 ANTIBODY    Narrative:     Release to patient->Immediate   HEPATITIS C ANTIBODY    Narrative:     Release to patient->Immediate   CBC W/ AUTO DIFFERENTIAL   COMPREHENSIVE METABOLIC PANEL   LIPASE   POCT GLUCOSE   POCT GLUCOSE MONITORING CONTINUOUS          Imaging Results    None          Medications   ondansetron disintegrating tablet 4 mg (4 mg Oral Given 10/25/22 2118)   famotidine (PF) injection 20 mg (20 mg Intravenous Given 10/25/22 2129)   lactated ringers bolus 1,000 mL (0 mLs Intravenous Stopped 10/26/22 0019)   morphine injection 2 mg (2 mg Intravenous Given 10/25/22 2218)    pantoprazole injection 40 mg (40 mg Intravenous Given 10/25/22 2218)     Medical Decision Making:   History:   Old Medical Records: I decided to obtain old medical records.  Clinical Tests:   Lab Tests: Ordered and Reviewed  Additional MDM:   Comments: 27-year-old male with history of peptic ulcer disease presents complaining of bilateral upper abdominal pain with associated nausea and vomiting as well as diarrhea.  Symptoms began today under consistent with his previous episodes of pain from his ulcer.  On exam he had diffuse abdominal tenderness greatest in epigastric region.  Vital signs within normal limits.  Labs ordered and without significant abnormalities.  Will give IV fluids, Pepcid, Protonix and morphine..      Scribe Attestation:   Scribe #1: I performed the above scribed service and the documentation accurately describes the services I performed. I attest to the accuracy of the note.      ED Course as of 10/26/22 0118   Tue Oct 25, 2022   2316 On reassessment the patient was resting comfortably.  He denies any further pain.  Will give a p.o. challenge and if tolerated will discharge home with a prescription for Pepcid. [AA]   2345 P.o. challenge tolerated.  Patient will be discharged home with a prescription for Zofran and Pepcid.  PCP follow-up for re-evaluation. [AA]      ED Course User Index  [AA] Faustina Vincent MD               I, Faustina Vincent  , personally performed the services described in this documentation. All medical record entries made by the scribe were at my direction and in my presence. I have reviewed the chart and agree that the record reflects my personal performance and is accurate and complete.    Clinical Impression:   Final diagnoses:  [R11.2, R19.7] Nausea vomiting and diarrhea (Primary)  [R10.11, R10.12] Acute bilateral upper abdominal pain      ED Disposition Condition    Discharge Stable          ED Prescriptions       Medication Sig Dispense Start Date End Date Auth. Provider     famotidine (PEPCID) 20 MG tablet Take 1 tablet (20 mg total) by mouth 2 (two) times daily. 60 tablet 10/25/2022 11/24/2022 Faustina Vincent MD    ondansetron (ZOFRAN-ODT) 8 MG TbDL Take 1 tablet (8 mg total) by mouth every 6 (six) hours as needed (Nausea). 15 tablet 10/25/2022 -- Faustina Vincent MD          Follow-up Information       Follow up With Specialties Details Why Contact Info    Primary care  Schedule an appointment as soon as possible for a visit  For re-evaluation as needed              Faustina Vincent MD  10/26/22 0118

## 2022-10-26 NOTE — FIRST PROVIDER EVALUATION
Emergency Department TeleTriage Encounter Note      CHIEF COMPLAINT    Chief Complaint   Patient presents with    Vomiting     Reports abd pain with N/V/D, pain starts at RUQ radiates to LUQ, onset this am, Hx H. Pylori and gastric ulcers. Abd tender to touch, unable to tolerate oral intake.        VITAL SIGNS   Initial Vitals [10/25/22 2019]   BP Pulse Resp Temp SpO2   137/83 88 17 99.8 °F (37.7 °C) 100 %      MAP       --            ALLERGIES    Review of patient's allergies indicates:  No Known Allergies    PROVIDER TRIAGE NOTE  This is a teletriage evaluation of a 27 y.o. male presenting to the ED with c/o abd pain x 1 day. (+) N/v/d.     ROS: (-) fever, (-) rash  PE:  NAD    Initial orders will be placed and care will be transferred to an alternate provider when patient is roomed for a full evaluation. Any additional orders and the final disposition will be determined by that provider.         ORDERS  Labs Reviewed   HIV 1 / 2 ANTIBODY   HEPATITIS C ANTIBODY   CBC W/ AUTO DIFFERENTIAL   COMPREHENSIVE METABOLIC PANEL   LIPASE   POCT GLUCOSE   POCT GLUCOSE MONITORING CONTINUOUS       ED Orders (720h ago, onward)      Start Ordered     Status Ordering Provider    10/25/22 2100 10/25/22 2055  ondansetron disintegrating tablet 4 mg  ED 1 Time         Ordered JAKE DOBSON    10/25/22 2056 10/25/22 2055  Vital signs  Every 2 hours         Ordered JAKE DOBSON    10/25/22 2055 10/25/22 2055  Diet NPO  Diet effective now         Ordered JAKE DOBSON    10/25/22 2055 10/25/22 2055  Insert peripheral IV  Once         Ordered JAKE DOBSON    10/25/22 2055 10/25/22 2055  CBC W/ AUTO DIFFERENTIAL  STAT         Ordered JAKE DOBSON    10/25/22 2055 10/25/22 2055  Comp. Metabolic Panel  STAT         Ordered JAKE DOBSON    10/25/22 2055 10/25/22 2055  Lipase  STAT         Ordered JAKE DOBSON    10/25/22 2023 10/25/22 2023  POCT glucose  Once         Final result EMERGENCY, DEPT  PHYSICIAN    10/25/22 2023 10/25/22 2022  POCT glucose  Once         Acknowledged JANEEN SWANN    10/25/22 2021 10/25/22 2021  HIV 1/2 Ag/Ab (4th Gen)  STAT         Ordered VITOR ESPINAL    10/25/22 2021 10/25/22 2021  Hepatitis C Antibody  STAT         Ordered VITOR ESPINAL              Virtual Visit Note: The provider triage portion of this emergency department evaluation and documentation was performed via [employer], a HIPAA-compliant telemedicine application, in concert with a tele-presenter in the room. A face to face patient evaluation with one of my colleagues will occur once the patient is placed in an emergency department room.      DISCLAIMER: This note was prepared with [employer] voice recognition transcription software. Garbled syntax, mangled pronouns, and other bizarre constructions may be attributed to that software system.

## 2022-10-26 NOTE — FIRST PROVIDER EVALUATION
Emergency Department TeleTriage Encounter Note      CHIEF COMPLAINT    Chief Complaint   Patient presents with    Abdominal Pain    Diarrhea    Vomiting     Pt c.o diffuse abd pain with vomiting and diarrhea onset yesterday. Pt was seen here yesterday. No improvement. Pt states meds not working.  AAO x 3 nadn skin w.d  vomit x 1 today  diarrhea x 4  lips slightly dry        VITAL SIGNS   Initial Vitals [10/26/22 1726]   BP Pulse Resp Temp SpO2   (!) 134/101 75 18 98.4 °F (36.9 °C) 98 %      MAP       --            ALLERGIES    Review of patient's allergies indicates:  No Known Allergies    PROVIDER TRIAGE NOTE  This is a teletriage evaluation of a 27 y.o. male presenting to the ED with c/o continuation of N/V, seen yesterday without emergent findings. Cessation of diarrhea today. Failed zofran at home. Denies marijuana use.     PE: No respiratory distress, speaks in full sentences without issue. No active emesis nor cough. Normal eye contact and mentation.     Plan: meds, flu swab. Further/augmented workup at discretion of examining provider.     All ED beds are full at present; patient notified of this status.  Patient seen and medically screened by YARA via teletriage. Orders initiated at triage to expedite care.  Patient is stable and will be placed in an ED bed when available.  Care will be transferred to an alternate provider when patient has been placed in an Exam Room further exam, additional orders, and disposition.         ORDERS  Labs Reviewed - No data to display    ED Orders (720h ago, onward)      None              Virtual Visit Note: The provider triage portion of this emergency department evaluation and documentation was performed via [employer], a HIPAA-compliant telemedicine application, in concert with a tele-presenter in the room. A face to face patient evaluation with one of my colleagues will occur once the patient is placed in an emergency department room.      DISCLAIMER: This note was  prepared with [employer] voice recognition transcription software. Garbled syntax, mangled pronouns, and other bizarre constructions may be attributed to that software system.

## 2022-10-26 NOTE — Clinical Note
"Clinton Brooks (Ryan) was seen and treated in our emergency department on 10/26/2022.  He may return to work on 10/28/2022.       If you have any questions or concerns, please don't hesitate to call.       RN    "

## 2022-10-26 NOTE — ED TRIAGE NOTES
26 y/o male presents to ED with c/o 7 episodes of vomiting since this morning with associated generalized abdominal pain.  Pt denies blood in vomit.  Pt denies SOB and CP.  Pt AAOx4.  VSS..    Patient identifiers for Clinton Brooks checked and correct   LOC: Patient is awake, alert, and aware of environment with an appropriate affect.  Patient is oriented x4 and speaking appropriately.  APPEARANCE: Patient resting comfortably and in no acute distress.  Patient is clean and well groomed, patient's clothing is properly fastened.  SKIN: The skin is warm and dry.  Patient has normal skin turgor and moist mucus membranes.  Skin is intact: no bruising or breakdown noted.  MUSCULOSKELETAL: Patient is moving all extremities well, no obvious swelling or deformities noted. Pulses intact.  RESPIRATORY: Airway is open and patent.  Respirations are spontaneous, even and unlabored.  Normal effort and rate noted.  CARDIAC: Patient has a normal rate and rhythm.  No peripheral edema noted.  Capillary refill < 3 seconds.  ABDOMEN: No abd distention noted.  Bowel sounds active in all 4 quadrants.  Soft and non-tender upon palpation.  NEUROLOGICAL: PERRLA.  Facial expression is symmetrical.  Hand grasps are equal bilaterally.  Normal sensation in all extremities when touched with finger.  Following commands appropriately.

## 2022-10-27 NOTE — ED NOTES
Paperwork given & discharged by Rn.    No prescriptions attached.  Referral to Gastroenterologist.

## 2022-10-27 NOTE — ED PROVIDER NOTES
Encounter Date: 10/26/2022       History     Chief Complaint   Patient presents with    Abdominal Pain    Diarrhea    Vomiting     Pt c.o diffuse abd pain with vomiting and diarrhea onset yesterday. Pt was seen here yesterday. No improvement. Pt states meds not working.  AAO x 3 nadn skin w.d  vomit x 1 today  diarrhea x 4  lips slightly dry      Patient is a 27-year-old male with history abdominal ulcers who presents to the emergency department with nausea and vomiting.  Patient reports he began feeling unwell yesterday.  He reports he was seen in the emergency department and discharged after the vomiting improved.  He reports since waking up this morning, the vomiting has worsened.  He reports over 10 episodes of vomiting.  He reports streaks of blood in the vomit.  He reports epigastric and right upper quadrant pain.  He denies fevers.  He reports being told he has had H pylori, abdominal ulcers, and gallbladder sludge.    The history is provided by the patient.   Review of patient's allergies indicates:  No Known Allergies  History reviewed. No pertinent past medical history.  Past Surgical History:   Procedure Laterality Date    ESOPHAGOGASTRODUODENOSCOPY N/A 11/19/2019    Procedure: EGD (ESOPHAGOGASTRODUODENOSCOPY);  Surgeon: Kemar Pittman III, MD;  Location: [facility] [place];  Service: Endoscopy;  Laterality: N/A;     Family History   Problem Relation Age of Onset    Arthritis Mother     Asthma Mother     Cancer Mother     Diabetes Mother     Hypertension Mother     Arthritis Father     Diabetes Father     Hypertension Father     Asthma Maternal Aunt     Cancer Maternal Aunt     Diabetes Maternal Aunt     Hypertension Maternal Aunt     Asthma Maternal Uncle     Diabetes Maternal Uncle     Hypertension Maternal Uncle     Hypertension Paternal Aunt     Hypertension Paternal Uncle     Arthritis Maternal Grandmother     Cancer Maternal Grandmother     Heart disease Maternal Grandmother     Hypertension Maternal  Grandmother     Arthritis Maternal Grandfather     Heart disease Maternal Grandfather     Hypertension Maternal Grandfather     Arthritis Paternal Grandmother     Hypertension Paternal Grandmother     Arthritis Paternal Grandfather     Hypertension Paternal Grandfather      Social History     Tobacco Use    Smoking status: Never    Smokeless tobacco: Never   Substance Use Topics    Alcohol use: No     Comment: social    Drug use: No     Review of Systems   Constitutional:  Positive for appetite change. Negative for activity change, chills, fatigue and fever.   HENT:  Negative for congestion, ear discharge, ear pain, postnasal drip, rhinorrhea, sore throat and trouble swallowing.    Respiratory:  Negative for cough and shortness of breath.    Cardiovascular:  Negative for chest pain.   Gastrointestinal:  Positive for abdominal pain, diarrhea, nausea and vomiting. Negative for blood in stool and constipation.   Genitourinary:  Negative for flank pain and hematuria.   Musculoskeletal:  Negative for back pain, neck pain and neck stiffness.   Skin:  Negative for rash and wound.   Neurological:  Negative for dizziness, weakness, light-headedness and headaches.     Physical Exam     Initial Vitals [10/26/22 1726]   BP Pulse Resp Temp SpO2   (!) 134/101 75 18 98.4 °F (36.9 °C) 98 %      MAP       --         Physical Exam    Nursing note and vitals reviewed.  Constitutional: He appears well-developed and well-nourished. He is not diaphoretic.  Non-toxic appearance. He appears distressed (Actively vomiting on exam).   HENT:   Head: Normocephalic.   Right Ear: Hearing and external ear normal.   Left Ear: Hearing and external ear normal.   Nose: Nose normal.   Mouth/Throat: Uvula is midline and oropharynx is clear and moist. Mucous membranes are dry. No oropharyngeal exudate.   Eyes: Conjunctivae are normal. Pupils are equal, round, and reactive to light.   Neck:   Normal range of motion.  Cardiovascular:  Normal rate and  regular rhythm.           Pulmonary/Chest: Breath sounds normal.   Abdominal: Abdomen is soft. Bowel sounds are normal. He exhibits no distension. There is abdominal tenderness. There is guarding.   Musculoskeletal:      Cervical back: Normal range of motion.     Neurological: He is alert and oriented to person, place, and time.   Skin: Skin is warm and dry. Capillary refill takes less than 2 seconds.   Psychiatric: He has a normal mood and affect.       ED Course   Procedures  Labs Reviewed   CBC W/ AUTO DIFFERENTIAL - Abnormal; Notable for the following components:       Result Value    MCV 81 (*)     Gran # (ANC) 8.2 (*)     Gran % 76.7 (*)     Lymph % 17.0 (*)     All other components within normal limits   COMPREHENSIVE METABOLIC PANEL - Abnormal; Notable for the following components:    Glucose 125 (*)     All other components within normal limits   LIPASE   URINALYSIS, REFLEX TO URINE CULTURE   POCT INFLUENZA A/B MOLECULAR          Imaging Results              US Abdomen Limited (Final result)  Result time 10/26/22 21:29:18      Final result by Ibis Bruner MD (10/26/22 21:29:18)                   Impression:      No cholelithiasis or evidence of acute cholecystitis.      Electronically signed by: Ibis Bruner  Date:    10/26/2022  Time:    21:29               Narrative:    EXAMINATION:  ULTRASOUND ABDOMEN LIMITED (GALLBLADDER)    CLINICAL HISTORY:  Epigastric pain.    TECHNIQUE:  Limited ultrasound of the right upper quadrant of the abdomen with attention to the gallbladder was performed.    COMPARISON:  None.    FINDINGS:  Gallbladder: No calculi.  No wall thickening, or pericholecystic fluid.  No sonographic Muñoz's sign.    Biliary system: The common duct is not dilated, measuring 3 mm.  No intrahepatic ductal dilatation.    Miscellaneous: Pancreas is obscured by bowel gas.                                       Medications   sodium chloride 0.9% bolus 1,000 mL (1,000 mLs Intravenous New Bag  10/26/22 2036)   promethazine (PHENERGAN) 25 mg in dextrose 5 % 50 mL IVPB (0 mg Intravenous Stopped 10/26/22 2051)   morphine injection 4 mg (4 mg Intravenous Given 10/26/22 2030)     Medical Decision Making:   Initial Assessment:   Urgent evaluation of a 27-year-old male who presents to the emergency department with abdominal pain and nausea and vomiting.  Patient is afebrile, nontoxic appearing, hemodynamically stable.  Patient has significant tenderness in the epigastric and right upper quadrant.  The patient is actively vomiting on exam.  I am concerned for acute cholecystitis.  Labs will be obtained.  Ultrasound is pending.  Patient will be given fluids and antiemetics.  ED Management:  9:43 PM  No significant leukocytosis.  Normal H&H.  Normal LFTs.  No elevation in bilirubin.  Normal lipase.  No kidney insufficiency or electrolyte disturbance.  Ultrasound reveals no cholelithiasis or evidence of acute cholecystitis.  Patient is still significantly tender on repeat exam.  Will obtain CT abdomen pelvis at this time.    9:44 PM  Handing off case to supervising physician for result follow up and dispo.                        Clinical Impression:   Final diagnoses:  [R10.9] Abdominal pain, unspecified abdominal location (Primary)  [R11.2] Nausea and vomiting, unspecified vomiting type             Rosemarie Paige PA-C  10/26/22 2145

## 2022-10-27 NOTE — ED TRIAGE NOTES
Pt presents to the ER with complaints of diffuse abd pain with vomiting and diarrhea onset yesterday. Pt was seen in this yesterday for same complaints; reports no relief in symptoms with prescribed medications. Pt repors a hx of gastric ulcers requiring hospitalization in 2019. Pt states he is now noticing his emesis looks like coffee with red streaks.

## 2022-10-27 NOTE — ED NOTES
LOC: The patient is awake, alert, and oriented to self, place, time, and situation. Pt is calm and cooperative. Affect is appropriate.  Speech is appropriate and clear.     APPEARANCE: Patient resting on stretcher; appears uncomfortable but in no acute distress.  Patient is clean and well groomed.    SKIN: The skin is warm and dry; color consistent with ethnicity.  Patient has normal skin turgor and moist mucus membranes.  Skin intact; no breakdown or bruising noted.     MUSCULOSKELETAL: Patient moving upper and lower extremities without difficulty; denies pain in the extremities or back.  Denies weakness.     RESPIRATORY: Airway is open and patent. Respirations spontaneous, even, easy, and non-labored.  Patient has a normal effort and rate.  No accessory muscle use noted. Denies cough.     CARDIAC:  Normal rate noted.  Pt is hypertensive. No peripheral edema noted. No complaints of chest pain.     ABDOMEN: Soft and non tender to palpation.  No distention noted. Pt complains of diffuse abdominal pain with nausea, vomiting, and diarrhea,    NEUROLOGIC: Eyes open spontaneously.  Behavior appropriate to situation.  Follows commands; facial expression symmetrical.  Purposeful motor response noted; normal sensation in all extremities. Pt denies headache; denies lightheadedness or dizziness; denies visual disturbances; denies loss of balance; denies unilateral weakness.

## 2024-02-21 ENCOUNTER — HOSPITAL ENCOUNTER (EMERGENCY)
Facility: OTHER | Age: 29
Discharge: HOME | End: 2024-02-21
Attending: EMERGENCY MEDICINE
Payer: MEDICAID

## 2024-02-21 VITALS
SYSTOLIC BLOOD PRESSURE: 132 MMHG | DIASTOLIC BLOOD PRESSURE: 71 MMHG | TEMPERATURE: 98 F | RESPIRATION RATE: 18 BRPM | HEIGHT: 71 IN | HEART RATE: 75 BPM | WEIGHT: 240 LBS | BODY MASS INDEX: 33.6 KG/M2 | OXYGEN SATURATION: 98 %

## 2024-02-21 DIAGNOSIS — N48.1 BALANITIS: Primary | ICD-10-CM

## 2024-02-21 DIAGNOSIS — N50.89 GENITAL LESION, MALE: ICD-10-CM

## 2024-02-21 LAB
HCV AB SERPL QL IA: NEGATIVE
HIV 1+2 AB+HIV1 P24 AG SERPL QL IA: NEGATIVE
POCT GLUCOSE: 94 MG/DL (ref 70–110)
RPR SER QL: NORMAL

## 2024-02-21 PROCEDURE — 87491 CHLMYD TRACH DNA AMP PROBE: CPT | Performed by: PHYSICIAN ASSISTANT

## 2024-02-21 PROCEDURE — 87529 HSV DNA AMP PROBE: CPT | Mod: 59 | Performed by: PHYSICIAN ASSISTANT

## 2024-02-21 PROCEDURE — 36415 COLL VENOUS BLD VENIPUNCTURE: CPT | Performed by: EMERGENCY MEDICINE

## 2024-02-21 PROCEDURE — 87389 HIV-1 AG W/HIV-1&-2 AB AG IA: CPT | Performed by: EMERGENCY MEDICINE

## 2024-02-21 PROCEDURE — 63600175 PHARM REV CODE 636 W HCPCS: Performed by: PHYSICIAN ASSISTANT

## 2024-02-21 PROCEDURE — 86592 SYPHILIS TEST NON-TREP QUAL: CPT | Performed by: EMERGENCY MEDICINE

## 2024-02-21 PROCEDURE — 82962 GLUCOSE BLOOD TEST: CPT

## 2024-02-21 PROCEDURE — 96372 THER/PROPH/DIAG INJ SC/IM: CPT | Performed by: PHYSICIAN ASSISTANT

## 2024-02-21 PROCEDURE — 99284 EMERGENCY DEPT VISIT MOD MDM: CPT | Mod: 25

## 2024-02-21 PROCEDURE — 86803 HEPATITIS C AB TEST: CPT | Performed by: EMERGENCY MEDICINE

## 2024-02-21 RX ORDER — CLOTRIMAZOLE AND BETAMETHASONE DIPROPIONATE 10; .64 MG/G; MG/G
CREAM TOPICAL 2 TIMES DAILY
Status: DISCONTINUED | OUTPATIENT
Start: 2024-02-21 | End: 2024-02-21

## 2024-02-21 RX ORDER — CLOTRIMAZOLE AND BETAMETHASONE DIPROPIONATE 10; .64 MG/G; MG/G
CREAM TOPICAL 2 TIMES DAILY
Qty: 15 G | Refills: 0 | Status: SHIPPED | OUTPATIENT
Start: 2024-02-21 | End: 2024-02-26

## 2024-02-21 RX ORDER — DOXYCYCLINE 100 MG/1
100 CAPSULE ORAL 2 TIMES DAILY
Qty: 14 CAPSULE | Refills: 0 | Status: SHIPPED | OUTPATIENT
Start: 2024-02-21 | End: 2024-02-28

## 2024-02-21 RX ORDER — CEFTRIAXONE 500 MG/1
500 INJECTION, POWDER, FOR SOLUTION INTRAMUSCULAR; INTRAVENOUS
Status: COMPLETED | OUTPATIENT
Start: 2024-02-21 | End: 2024-02-21

## 2024-02-21 RX ADMIN — CEFTRIAXONE SODIUM 500 MG: 500 INJECTION, POWDER, FOR SOLUTION INTRAMUSCULAR; INTRAVENOUS at 06:02

## 2024-02-21 NOTE — FIRST PROVIDER EVALUATION
" Emergency Department TeleTriage Encounter Note      CHIEF COMPLAINT    Chief Complaint   Patient presents with    std testing     Pt reporting red spots to "penis head" x 2 weeks. Denies urinary symptoms.        VITAL SIGNS   Initial Vitals [02/21/24 1645]   BP Pulse Resp Temp SpO2   136/86 89 18 97.7 °F (36.5 °C) 99 %      MAP       --            ALLERGIES    Review of patient's allergies indicates:  No Known Allergies    PROVIDER TRIAGE NOTE  This is a teletriage evaluation of a 29 y.o. male presenting to the ED complaining of "red spots" on penis for two weeks. Denies pain. No discharge.     Alert, no distress.     Initial orders will be placed and care will be transferred to an alternate provider when patient is roomed for a full evaluation. Any additional orders and the final disposition will be determined by that provider.         ORDERS  Labs Reviewed   HIV 1 / 2 ANTIBODY   HEPATITIS C ANTIBODY       ED Orders (720h ago, onward)      Start Ordered     Status Ordering Provider    02/21/24 1647 02/21/24 1646  HIV 1/2 Ag/Ab (4th Gen)  STAT         Ordered SIOMARA MONTOYA    02/21/24 1647 02/21/24 1646  Hepatitis C Antibody  STAT         Ordered SIOMARA MONTOYA              Virtual Visit Note: The provider triage portion of this emergency department evaluation and documentation was performed via [employer], a HIPAA-compliant telemedicine application, in concert with a tele-presenter in the room. A face to face patient evaluation with one of my colleagues will occur once the patient is placed in an emergency department room.      DISCLAIMER: This note was prepared with [employer] voice recognition transcription software. Garbled syntax, mangled pronouns, and other bizarre constructions may be attributed to that software system.    "

## 2024-02-21 NOTE — Clinical Note
"Clinton Brown" Cecilia was seen and treated in our emergency department on 2/21/2024.  He may return to work on 02/22/2024.       If you have any questions or concerns, please don't hesitate to call.      Delia Navarrete PA"

## 2024-02-21 NOTE — ED TRIAGE NOTES
"C/o "bumps" to head of penis x 2 weeks. Denies drainage or discharge. No urinary symptoms reported. Presents in no distress.   "

## 2024-02-22 LAB
C TRACH DNA SPEC QL NAA+PROBE: NOT DETECTED
N GONORRHOEA DNA SPEC QL NAA+PROBE: NOT DETECTED

## 2024-02-22 NOTE — ED PROVIDER NOTES
"     Source of History:  Patient    Chief complaint:  std testing (Pt reporting red spots to "penis head" x 2 weeks. Denies urinary symptoms. )      HPI:  Clinton Brooks is a 29 y.o. male presenting with rash to penis.  Patient states that he 1st noted area 2 weeks ago.  States that is isolated to the glans penis.  Denies any pain or pruritus.  States that he has had similar rash in the past after intercourse however denies any known STI exposure previous STI.  Does report he is uncircumcised.  Denies any penile discharge or dysuria.  He has not tried any medications for the symptoms.    This is the extent to the patients complaints today here in the emergency department.    ROS: As per HPI    Review of patient's allergies indicates:  No Known Allergies    PMH:  As per HPI and below:  No past medical history on file.  Past Surgical History:   Procedure Laterality Date    ESOPHAGOGASTRODUODENOSCOPY N/A 11/19/2019    Procedure: EGD (ESOPHAGOGASTRODUODENOSCOPY);  Surgeon: Kemar Pittman III, MD;  Location: [facility];  Service: Endoscopy;  Laterality: N/A;       Social History     Tobacco Use    Smoking status: Never    Smokeless tobacco: Never   Substance Use Topics    Alcohol use: No     Comment: social    Drug use: No       Physical Exam:    /71 (BP Location: Right arm, Patient Position: Sitting)   Pulse 75   Temp 98.2 °F (36.8 °C)   Resp 18   Ht 5' 11" (1.803 m)   Wt 108.9 kg (240 lb)   SpO2 98%   BMI 33.47 kg/m²   Nursing note and vital signs reviewed.  Constitutional: No acute distress.  Nontoxic  Eyes: No conjunctival injection.Extraocular muscles are intact.  ENT: Oropharynx clear.  Normal phonation.   Cardiovascular: Regular rate  Respiratory: . No accessory muscle use..  Abdomen: Soft.  Not distended.   Musculoskeletal: Good range of motion all joints.  No deformities.  Neck supple.  No meningismus.  :  Chaperoned exam reveals uncircumcised penis.  No phimosis or paraphimosis.  Slight " irritation about the glans penis and noted pearly penile papules.  No chancre ulcerated lesions.  No vesicular lesions.  No discharge.  No testicular abnormality  Skin:  See   Good turgor.  No abrasions.  No ecchymoses.  Neurologic: Motor intact.  Sensation intact.  No ataxia. No focal neurological deficits.  Psych: Appropriate, conversant    Labs that have been ordered have been independently reviewed and interpreted by myself.    I decided to obtain the patient's medical records.      MDM/ Differential Dx:    29 y.o. male with  complaint  Physical exam reveals male in no obvious distress. Slight irritation about the glans penis and noted pearly penile papules.  No chancre ulcerated lesions.  No vesicular lesions.  No discharge.  No testicular abnormality Exam reveals No testicle pain or abnormality on exam    DDX: urethritis gonococcal vs nongonococcal, UTI, epididymitis, balanitis, phimosis, paraphimosis, pearly penile papules    ED management: UA sent for G/C although discussed overall impression is consistent with balanitis.  Blood glucose within normal limits.  We will also add syphilis although discussed very low suspicion at this time; After complete evaluation, including thorough history and physical exam, the patient was felt to be at high enough risk of STI to warrant empiric treatment.The patient was counseled extensively on sexual health, including the need to f/u with PCP or other formal STI clinic for further evaluation/management and test of cure. The patient was instructed to refrain from further sexual activity until successfully tested and treated, and to inform any/all partners of their need for testing and treatment. Patient stated understanding.  Discussed we will call with any abnormal results however encouraged to apply topical cream once again symptoms most consistent with balanitis    Impression/Plan: The primary encounter diagnosis was Balanitis. A diagnosis of Genital lesion, male was  also pertinent to this visit.  Patient cautioned on when to return to ED.  Pt. Understands and agrees with current treatment plan        Results for orders placed or performed during the hospital encounter of 02/21/24   HIV 1/2 Ag/Ab (4th Gen)   Result Value Ref Range    HIV 1/2 Ag/Ab Negative Negative   Hepatitis C Antibody   Result Value Ref Range    Hepatitis C Ab Negative Negative   HSV by Rapid PCR, Non-Blood [facility]; Urethra   Result Value Ref Range    HSV PCR Source Urethra    RPR   Result Value Ref Range    RPR Non-reactive Non-reactive   POCT glucose   Result Value Ref Range    POCT Glucose 94 70 - 110 mg/dL     Imaging Results    None            Diagnostic Impression:    1. Balanitis    2. Genital lesion, male         ED Disposition Condition    Discharge Stable            ED Prescriptions       Medication Sig Dispense Start Date End Date Auth. Provider    doxycycline (VIBRAMYCIN) 100 MG Cap Take 1 capsule (100 mg total) by mouth 2 (two) times daily. for 7 days 14 capsule 2/21/2024 2/28/2024 Delia Navarrete PA    clotrimazole-betamethasone 1-0.05% (LOTRISONE) cream Apply topically 2 (two) times daily. for 5 days 15 g 2/21/2024 2/26/2024 Delia Navarrete PA          Follow-up Information       Follow up With Specialties Details Why Contact [facility] Schedule an appointment as soon as possible for a visit   [id number]  [address] [address]  [phone]             Delia Navarrete PA  02/22/24 9580

## 2024-02-23 LAB
HSV1 DNA SPEC QL NAA+PROBE: NEGATIVE
HSV2 DNA SPEC QL NAA+PROBE: NEGATIVE
SPECIMEN SOURCE: NORMAL

## 2024-06-04 ENCOUNTER — HOSPITAL ENCOUNTER (OUTPATIENT)
Facility: OTHER | Age: 29
Discharge: HOME | End: 2024-06-05
Attending: EMERGENCY MEDICINE | Admitting: EMERGENCY MEDICINE

## 2024-06-04 DIAGNOSIS — R11.0 NAUSEA: ICD-10-CM

## 2024-06-04 DIAGNOSIS — K20.90 ESOPHAGITIS: ICD-10-CM

## 2024-06-04 DIAGNOSIS — R10.9 INTRACTABLE ABDOMINAL PAIN: ICD-10-CM

## 2024-06-04 DIAGNOSIS — R10.9 ABDOMINAL PAIN, UNSPECIFIED ABDOMINAL LOCATION: Primary | ICD-10-CM

## 2024-06-04 LAB
ALBUMIN SERPL BCP-MCNC: 4.4 G/DL (ref 3.5–5.2)
ALP SERPL-CCNC: 82 U/L (ref 55–135)
ALT SERPL W/O P-5'-P-CCNC: 20 U/L (ref 10–44)
ANION GAP SERPL CALC-SCNC: 8 MMOL/L (ref 8–16)
AST SERPL-CCNC: 19 U/L (ref 10–40)
BASOPHILS # BLD AUTO: 0.02 K/UL (ref 0–0.2)
BASOPHILS NFR BLD: 0.2 % (ref 0–1.9)
BILIRUB SERPL-MCNC: 0.4 MG/DL (ref 0.1–1)
BILIRUB UR QL STRIP: NEGATIVE
BUN SERPL-MCNC: 14 MG/DL (ref 6–20)
CALCIUM SERPL-MCNC: 9.6 MG/DL (ref 8.7–10.5)
CHLORIDE SERPL-SCNC: 102 MMOL/L (ref 95–110)
CLARITY UR: CLEAR
CO2 SERPL-SCNC: 28 MMOL/L (ref 23–29)
COLOR UR: YELLOW
CREAT SERPL-MCNC: 1.1 MG/DL (ref 0.5–1.4)
CREAT SERPL-MCNC: 1.1 MG/DL (ref 0.5–1.4)
DIFFERENTIAL METHOD BLD: ABNORMAL
EOSINOPHIL # BLD AUTO: 0.8 K/UL (ref 0–0.5)
EOSINOPHIL NFR BLD: 7.8 % (ref 0–8)
ERYTHROCYTE [DISTWIDTH] IN BLOOD BY AUTOMATED COUNT: 13.1 % (ref 11.5–14.5)
EST. GFR  (NO RACE VARIABLE): >60 ML/MIN/1.73 M^2
GLUCOSE SERPL-MCNC: 89 MG/DL (ref 70–110)
GLUCOSE UR QL STRIP: NEGATIVE
HCT VFR BLD AUTO: 48.8 % (ref 40–54)
HGB BLD-MCNC: 16.1 G/DL (ref 14–18)
HGB UR QL STRIP: NEGATIVE
IMM GRANULOCYTES # BLD AUTO: 0.02 K/UL (ref 0–0.04)
IMM GRANULOCYTES NFR BLD AUTO: 0.2 % (ref 0–0.5)
KETONES UR QL STRIP: ABNORMAL
LEUKOCYTE ESTERASE UR QL STRIP: NEGATIVE
LIPASE SERPL-CCNC: 17 U/L (ref 4–60)
LYMPHOCYTES # BLD AUTO: 2.6 K/UL (ref 1–4.8)
LYMPHOCYTES NFR BLD: 26.9 % (ref 18–48)
MCH RBC QN AUTO: 26.4 PG (ref 27–31)
MCHC RBC AUTO-ENTMCNC: 33 G/DL (ref 32–36)
MCV RBC AUTO: 80 FL (ref 82–98)
MONOCYTES # BLD AUTO: 0.6 K/UL (ref 0.3–1)
MONOCYTES NFR BLD: 5.9 % (ref 4–15)
NEUTROPHILS # BLD AUTO: 5.7 K/UL (ref 1.8–7.7)
NEUTROPHILS NFR BLD: 59 % (ref 38–73)
NITRITE UR QL STRIP: NEGATIVE
NRBC BLD-RTO: 0 /100 WBC
PH UR STRIP: 6 [PH] (ref 5–8)
PLATELET # BLD AUTO: 232 K/UL (ref 150–450)
PMV BLD AUTO: 11 FL (ref 9.2–12.9)
POTASSIUM SERPL-SCNC: 4 MMOL/L (ref 3.5–5.1)
PROT SERPL-MCNC: 7.8 G/DL (ref 6–8.4)
PROT UR QL STRIP: ABNORMAL
RBC # BLD AUTO: 6.11 M/UL (ref 4.6–6.2)
SAMPLE: NORMAL
SODIUM SERPL-SCNC: 138 MMOL/L (ref 136–145)
SP GR UR STRIP: 1.03 (ref 1–1.03)
URN SPEC COLLECT METH UR: ABNORMAL
UROBILINOGEN UR STRIP-ACNC: NEGATIVE EU/DL
WBC # BLD AUTO: 9.65 K/UL (ref 3.9–12.7)

## 2024-06-04 PROCEDURE — G0378 HOSPITAL OBSERVATION PER HR: HCPCS

## 2024-06-04 PROCEDURE — 81003 URINALYSIS AUTO W/O SCOPE: CPT | Performed by: PHYSICIAN ASSISTANT

## 2024-06-04 PROCEDURE — 96374 THER/PROPH/DIAG INJ IV PUSH: CPT

## 2024-06-04 PROCEDURE — 96361 HYDRATE IV INFUSION ADD-ON: CPT

## 2024-06-04 PROCEDURE — 36415 COLL VENOUS BLD VENIPUNCTURE: CPT | Performed by: PHYSICIAN ASSISTANT

## 2024-06-04 PROCEDURE — 99285 EMERGENCY DEPT VISIT HI MDM: CPT | Mod: 25

## 2024-06-04 PROCEDURE — 83690 ASSAY OF LIPASE: CPT | Performed by: PHYSICIAN ASSISTANT

## 2024-06-04 PROCEDURE — 25500020 PHARM REV CODE 255: Performed by: PHYSICIAN ASSISTANT

## 2024-06-04 PROCEDURE — 80053 COMPREHEN METABOLIC PANEL: CPT | Performed by: PHYSICIAN ASSISTANT

## 2024-06-04 PROCEDURE — 96375 TX/PRO/DX INJ NEW DRUG ADDON: CPT

## 2024-06-04 PROCEDURE — 25000003 PHARM REV CODE 250: Performed by: PHYSICIAN ASSISTANT

## 2024-06-04 PROCEDURE — 85025 COMPLETE CBC W/AUTO DIFF WBC: CPT | Performed by: PHYSICIAN ASSISTANT

## 2024-06-04 PROCEDURE — C9113 INJ PANTOPRAZOLE SODIUM, VIA: HCPCS | Performed by: PHYSICIAN ASSISTANT

## 2024-06-04 PROCEDURE — 63600175 PHARM REV CODE 636 W HCPCS: Performed by: PHYSICIAN ASSISTANT

## 2024-06-04 RX ORDER — SODIUM CHLORIDE 0.9 % (FLUSH) 0.9 %
10 SYRINGE (ML) INJECTION
Status: DISCONTINUED | OUTPATIENT
Start: 2024-06-04 | End: 2024-06-05 | Stop reason: HOSPADM

## 2024-06-04 RX ORDER — FAMOTIDINE 10 MG/ML
20 INJECTION INTRAVENOUS EVERY 12 HOURS
Status: DISCONTINUED | OUTPATIENT
Start: 2024-06-04 | End: 2024-06-05 | Stop reason: HOSPADM

## 2024-06-04 RX ORDER — PANTOPRAZOLE SODIUM 40 MG/10ML
40 INJECTION, POWDER, LYOPHILIZED, FOR SOLUTION INTRAVENOUS
Status: COMPLETED | OUTPATIENT
Start: 2024-06-04 | End: 2024-06-04

## 2024-06-04 RX ORDER — LIDOCAINE HYDROCHLORIDE 20 MG/ML
15 SOLUTION OROPHARYNGEAL ONCE
Status: COMPLETED | OUTPATIENT
Start: 2024-06-04 | End: 2024-06-04

## 2024-06-04 RX ORDER — TALC
6 POWDER (GRAM) TOPICAL NIGHTLY PRN
Status: DISCONTINUED | OUTPATIENT
Start: 2024-06-04 | End: 2024-06-05 | Stop reason: HOSPADM

## 2024-06-04 RX ORDER — ALUMINUM HYDROXIDE, MAGNESIUM HYDROXIDE, AND SIMETHICONE 1200; 120; 1200 MG/30ML; MG/30ML; MG/30ML
30 SUSPENSION ORAL ONCE
Status: COMPLETED | OUTPATIENT
Start: 2024-06-04 | End: 2024-06-04

## 2024-06-04 RX ORDER — ALUMINUM HYDROXIDE, MAGNESIUM HYDROXIDE, AND SIMETHICONE 1200; 120; 1200 MG/30ML; MG/30ML; MG/30ML
30 SUSPENSION ORAL EVERY 4 HOURS PRN
Status: DISCONTINUED | OUTPATIENT
Start: 2024-06-04 | End: 2024-06-05 | Stop reason: HOSPADM

## 2024-06-04 RX ORDER — SODIUM CHLORIDE 9 MG/ML
1000 INJECTION, SOLUTION INTRAVENOUS CONTINUOUS
Status: ACTIVE | OUTPATIENT
Start: 2024-06-04 | End: 2024-06-05

## 2024-06-04 RX ORDER — MORPHINE SULFATE 4 MG/ML
4 INJECTION, SOLUTION INTRAMUSCULAR; INTRAVENOUS
Status: COMPLETED | OUTPATIENT
Start: 2024-06-04 | End: 2024-06-04

## 2024-06-04 RX ORDER — ONDANSETRON HYDROCHLORIDE 2 MG/ML
4 INJECTION, SOLUTION INTRAVENOUS EVERY 8 HOURS PRN
Status: DISCONTINUED | OUTPATIENT
Start: 2024-06-04 | End: 2024-06-05 | Stop reason: HOSPADM

## 2024-06-04 RX ADMIN — ALUMINUM HYDROXIDE, MAGNESIUM HYDROXIDE, AND SIMETHICONE 30 ML: 1200; 120; 1200 SUSPENSION ORAL at 04:06

## 2024-06-04 RX ADMIN — ALUMINUM HYDROXIDE, MAGNESIUM HYDROXIDE, AND SIMETHICONE 30 ML: 1200; 120; 1200 SUSPENSION ORAL at 10:06

## 2024-06-04 RX ADMIN — SODIUM CHLORIDE 1000 ML: 0.9 INJECTION, SOLUTION INTRAVENOUS at 08:06

## 2024-06-04 RX ADMIN — IOHEXOL 100 ML: 350 INJECTION, SOLUTION INTRAVENOUS at 07:06

## 2024-06-04 RX ADMIN — MORPHINE SULFATE 4 MG: 4 INJECTION, SOLUTION INTRAMUSCULAR; INTRAVENOUS at 08:06

## 2024-06-04 RX ADMIN — FAMOTIDINE 20 MG: 10 INJECTION, SOLUTION INTRAVENOUS at 10:06

## 2024-06-04 RX ADMIN — LIDOCAINE HYDROCHLORIDE 15 ML: 20 SOLUTION ORAL; TOPICAL at 04:06

## 2024-06-04 RX ADMIN — LIDOCAINE HYDROCHLORIDE 15 ML: 20 SOLUTION ORAL; TOPICAL at 10:06

## 2024-06-04 RX ADMIN — PANTOPRAZOLE SODIUM 40 MG: 40 INJECTION, POWDER, LYOPHILIZED, FOR SOLUTION INTRAVENOUS at 04:06

## 2024-06-04 RX ADMIN — SODIUM CHLORIDE 1000 ML: 9 INJECTION, SOLUTION INTRAVENOUS at 04:06

## 2024-06-04 NOTE — ED NOTES
URINE COLLECTION PENDING: Pt states he does not need to void at this time. Sterile specimen container and urine clean catch instructions given to patient. Pt instructed to notify nurse immediately once urine specimen has been obtained. Pt verbalize understanding. Will continue to monitor.

## 2024-06-04 NOTE — FIRST PROVIDER EVALUATION
Emergency Department TeleTriage Encounter Note      CHIEF COMPLAINT    Chief Complaint   Patient presents with    Abdominal Pain     Reports abd pain onset 3 days ago. +diarrhea.        VITAL SIGNS   Initial Vitals [06/04/24 1236]   BP Pulse Resp Temp SpO2   (!) 161/79 81 16 98.1 °F (36.7 °C) 97 %      MAP       --            ALLERGIES    Review of patient's allergies indicates:  No Known Allergies    PROVIDER TRIAGE NOTE  Patient presents with complaint of upper abdominal pain with nausea and diarrhea. No urinary symptoms. Symptoms started 3 days ago.      Phy:   Constitutional: well nourished, well developed, appearing stated age, NAD        Initial orders will be placed and care will be transferred to an alternate provider when patient is roomed for a full evaluation. Any additional orders and the final disposition will be determined by that provider.        ORDERS  Labs Reviewed - No data to display    ED Orders (720h ago, onward)      None              Virtual Visit Note: The provider triage portion of this emergency department evaluation and documentation was performed via [employer], a HIPAA-compliant telemedicine application, in concert with a tele-presenter in the room. A face to face patient evaluation with one of my colleagues will occur once the patient is placed in an emergency department room.      DISCLAIMER: This note was prepared with [employer] voice recognition transcription software. Garbled syntax, mangled pronouns, and other bizarre constructions may be attributed to that software system.

## 2024-06-04 NOTE — ED TRIAGE NOTES
Pt presents to ED today c/o abd pain onset x 3 days denies fever, N/V denies taking medication for sx   NAD noted

## 2024-06-05 ENCOUNTER — ANESTHESIA EVENT (OUTPATIENT)
Dept: ENDOSCOPY | Facility: OTHER | Age: 29
End: 2024-06-05

## 2024-06-05 ENCOUNTER — ANESTHESIA (OUTPATIENT)
Dept: ENDOSCOPY | Facility: OTHER | Age: 29
End: 2024-06-05

## 2024-06-05 VITALS
RESPIRATION RATE: 18 BRPM | SYSTOLIC BLOOD PRESSURE: 137 MMHG | BODY MASS INDEX: 33.47 KG/M2 | HEART RATE: 73 BPM | OXYGEN SATURATION: 99 % | TEMPERATURE: 98 F | WEIGHT: 240 LBS | DIASTOLIC BLOOD PRESSURE: 70 MMHG

## 2024-06-05 PROBLEM — K20.90 ESOPHAGITIS: Status: ACTIVE | Noted: 2024-06-05

## 2024-06-05 LAB
ALBUMIN SERPL BCP-MCNC: 3.7 G/DL (ref 3.5–5.2)
ALP SERPL-CCNC: 67 U/L (ref 55–135)
ALT SERPL W/O P-5'-P-CCNC: 17 U/L (ref 10–44)
ANION GAP SERPL CALC-SCNC: 7 MMOL/L (ref 8–16)
AST SERPL-CCNC: 14 U/L (ref 10–40)
BASOPHILS # BLD AUTO: 0.04 K/UL (ref 0–0.2)
BASOPHILS NFR BLD: 0.4 % (ref 0–1.9)
BILIRUB SERPL-MCNC: 0.4 MG/DL (ref 0.1–1)
BUN SERPL-MCNC: 15 MG/DL (ref 6–20)
CALCIUM SERPL-MCNC: 8.7 MG/DL (ref 8.7–10.5)
CHLORIDE SERPL-SCNC: 107 MMOL/L (ref 95–110)
CO2 SERPL-SCNC: 24 MMOL/L (ref 23–29)
CREAT SERPL-MCNC: 1.2 MG/DL (ref 0.5–1.4)
DIFFERENTIAL METHOD BLD: ABNORMAL
EOSINOPHIL # BLD AUTO: 0.8 K/UL (ref 0–0.5)
EOSINOPHIL NFR BLD: 8.7 % (ref 0–8)
ERYTHROCYTE [DISTWIDTH] IN BLOOD BY AUTOMATED COUNT: 13 % (ref 11.5–14.5)
EST. GFR  (NO RACE VARIABLE): >60 ML/MIN/1.73 M^2
GLUCOSE SERPL-MCNC: 88 MG/DL (ref 70–110)
HCT VFR BLD AUTO: 44.1 % (ref 40–54)
HGB BLD-MCNC: 14.4 G/DL (ref 14–18)
IMM GRANULOCYTES # BLD AUTO: 0.04 K/UL (ref 0–0.04)
IMM GRANULOCYTES NFR BLD AUTO: 0.4 % (ref 0–0.5)
LYMPHOCYTES # BLD AUTO: 2.6 K/UL (ref 1–4.8)
LYMPHOCYTES NFR BLD: 27.1 % (ref 18–48)
MCH RBC QN AUTO: 26.7 PG (ref 27–31)
MCHC RBC AUTO-ENTMCNC: 32.7 G/DL (ref 32–36)
MCV RBC AUTO: 82 FL (ref 82–98)
MONOCYTES # BLD AUTO: 0.7 K/UL (ref 0.3–1)
MONOCYTES NFR BLD: 7.4 % (ref 4–15)
NEUTROPHILS # BLD AUTO: 5.3 K/UL (ref 1.8–7.7)
NEUTROPHILS NFR BLD: 56 % (ref 38–73)
NRBC BLD-RTO: 0 /100 WBC
PLATELET # BLD AUTO: 209 K/UL (ref 150–450)
PMV BLD AUTO: 11.1 FL (ref 9.2–12.9)
POTASSIUM SERPL-SCNC: 3.7 MMOL/L (ref 3.5–5.1)
PROT SERPL-MCNC: 6.5 G/DL (ref 6–8.4)
RBC # BLD AUTO: 5.4 M/UL (ref 4.6–6.2)
SODIUM SERPL-SCNC: 138 MMOL/L (ref 136–145)
WBC # BLD AUTO: 9.46 K/UL (ref 3.9–12.7)

## 2024-06-05 PROCEDURE — 37000008 HC ANESTHESIA 1ST 15 MINUTES: Performed by: INTERNAL MEDICINE

## 2024-06-05 PROCEDURE — 88341 IMHCHEM/IMCYTCHM EA ADD ANTB: CPT | Mod: 26,,, | Performed by: PATHOLOGY

## 2024-06-05 PROCEDURE — 37000009 HC ANESTHESIA EA ADD 15 MINS: Performed by: INTERNAL MEDICINE

## 2024-06-05 PROCEDURE — 88342 IMHCHEM/IMCYTCHM 1ST ANTB: CPT | Performed by: PATHOLOGY

## 2024-06-05 PROCEDURE — 85025 COMPLETE CBC W/AUTO DIFF WBC: CPT | Performed by: PHYSICIAN ASSISTANT

## 2024-06-05 PROCEDURE — G0378 HOSPITAL OBSERVATION PER HR: HCPCS

## 2024-06-05 PROCEDURE — 88305 TISSUE EXAM BY PATHOLOGIST: CPT | Performed by: PATHOLOGY

## 2024-06-05 PROCEDURE — 27201012 HC FORCEPS, HOT/COLD, DISP: Performed by: INTERNAL MEDICINE

## 2024-06-05 PROCEDURE — 36415 COLL VENOUS BLD VENIPUNCTURE: CPT | Performed by: PHYSICIAN ASSISTANT

## 2024-06-05 PROCEDURE — 43239 EGD BIOPSY SINGLE/MULTIPLE: CPT | Performed by: INTERNAL MEDICINE

## 2024-06-05 PROCEDURE — 88305 TISSUE EXAM BY PATHOLOGIST: CPT | Mod: 26,,, | Performed by: PATHOLOGY

## 2024-06-05 PROCEDURE — 88312 SPECIAL STAINS GROUP 1: CPT | Performed by: PATHOLOGY

## 2024-06-05 PROCEDURE — 63600175 PHARM REV CODE 636 W HCPCS: Performed by: NURSE ANESTHETIST, CERTIFIED REGISTERED

## 2024-06-05 PROCEDURE — 80053 COMPREHEN METABOLIC PANEL: CPT | Performed by: PHYSICIAN ASSISTANT

## 2024-06-05 PROCEDURE — 88341 IMHCHEM/IMCYTCHM EA ADD ANTB: CPT | Performed by: PATHOLOGY

## 2024-06-05 PROCEDURE — D9220A PRA ANESTHESIA: Mod: ,,, | Performed by: ANESTHESIOLOGY

## 2024-06-05 PROCEDURE — 88342 IMHCHEM/IMCYTCHM 1ST ANTB: CPT | Mod: 26,,, | Performed by: PATHOLOGY

## 2024-06-05 PROCEDURE — 88312 SPECIAL STAINS GROUP 1: CPT | Mod: 26,,, | Performed by: PATHOLOGY

## 2024-06-05 PROCEDURE — 25000003 PHARM REV CODE 250: Performed by: NURSE ANESTHETIST, CERTIFIED REGISTERED

## 2024-06-05 RX ORDER — PANTOPRAZOLE SODIUM 40 MG/1
40 TABLET, DELAYED RELEASE ORAL DAILY
Qty: 30 TABLET | Refills: 1 | Status: SHIPPED | OUTPATIENT
Start: 2024-06-05 | End: 2024-06-12

## 2024-06-05 RX ORDER — FAMOTIDINE 40 MG/1
40 TABLET, FILM COATED ORAL NIGHTLY
Qty: 30 TABLET | Refills: 1 | Status: SHIPPED | OUTPATIENT
Start: 2024-06-05 | End: 2024-08-04

## 2024-06-05 RX ORDER — OXYCODONE HYDROCHLORIDE 5 MG/1
5 TABLET ORAL
Status: DISCONTINUED | OUTPATIENT
Start: 2024-06-05 | End: 2024-06-05 | Stop reason: HOSPADM

## 2024-06-05 RX ORDER — SODIUM CHLORIDE 0.9 % (FLUSH) 0.9 %
3 SYRINGE (ML) INJECTION
Status: DISCONTINUED | OUTPATIENT
Start: 2024-06-05 | End: 2024-06-05 | Stop reason: HOSPADM

## 2024-06-05 RX ORDER — PROCHLORPERAZINE EDISYLATE 5 MG/ML
5 INJECTION INTRAMUSCULAR; INTRAVENOUS EVERY 30 MIN PRN
Status: DISCONTINUED | OUTPATIENT
Start: 2024-06-05 | End: 2024-06-05 | Stop reason: HOSPADM

## 2024-06-05 RX ORDER — MEPERIDINE HYDROCHLORIDE 25 MG/ML
12.5 INJECTION INTRAMUSCULAR; INTRAVENOUS; SUBCUTANEOUS ONCE AS NEEDED
Status: DISCONTINUED | OUTPATIENT
Start: 2024-06-05 | End: 2024-06-05 | Stop reason: HOSPADM

## 2024-06-05 RX ORDER — LIDOCAINE HYDROCHLORIDE 20 MG/ML
INJECTION INTRAVENOUS
Status: DISCONTINUED | OUTPATIENT
Start: 2024-06-05 | End: 2024-06-05

## 2024-06-05 RX ORDER — HYDROMORPHONE HYDROCHLORIDE 2 MG/ML
0.4 INJECTION, SOLUTION INTRAMUSCULAR; INTRAVENOUS; SUBCUTANEOUS EVERY 5 MIN PRN
Status: DISCONTINUED | OUTPATIENT
Start: 2024-06-05 | End: 2024-06-05 | Stop reason: HOSPADM

## 2024-06-05 RX ORDER — PROPOFOL 10 MG/ML
VIAL (ML) INTRAVENOUS
Status: DISCONTINUED | OUTPATIENT
Start: 2024-06-05 | End: 2024-06-05

## 2024-06-05 RX ADMIN — PROPOFOL 50 MG: 10 INJECTION, EMULSION INTRAVENOUS at 08:06

## 2024-06-05 RX ADMIN — LIDOCAINE HYDROCHLORIDE 100 MG: 20 INJECTION, SOLUTION INTRAVENOUS at 08:06

## 2024-06-05 RX ADMIN — PROPOFOL 100 MG: 10 INJECTION, EMULSION INTRAVENOUS at 08:06

## 2024-06-05 RX ADMIN — SODIUM CHLORIDE, SODIUM LACTATE, POTASSIUM CHLORIDE, AND CALCIUM CHLORIDE: .6; .31; .03; .02 INJECTION, SOLUTION INTRAVENOUS at 08:06

## 2024-06-05 NOTE — CONSULTS
[facility] Emergency Dept (Observation)  Gastroenterology  Consult Note    Patient Name: Clinton Brooks  MRN: [id number]  Admission Date: 6/4/2024  Hospital Length of Stay: 0 days  Code Status: Full Code   Attending Provider:   Consulting Provider: Dominick Jackson MD  Primary Care Physician: No, Primary Doctor  Principal Problem:<principal problem not specified>    Inpatient consult to Gastroenterology  Consult performed by: Dominick Jackson MD  Consult ordered by: Delia Navarrete PA  Reason for consult: abdominal pain        Subjective:     HPI: This gent has had issues with epigastric pain that is sharp and has burn like characteristics associated with nausea and radiation to his RUQ and back. Meal or liquids exacerbate no no specific agents relieve. He has no acute visceral signs or that of ischemia or cholangitis. Due to severity he came to the ED for an evaluation. He had labs and a CT scan which were normal. He had ulcerative esophagitis on endoscopy in 2019. He has no bowel related issues and he does not smoke, use illegal drugs or consume alcohol. He has no family history of liver disease or colon cancer. EGD was advised and explained in detail. He has lost some weight and appears very concerned as these symptoms are relatively new and consequential.    No past medical history on file.    Past Surgical History:   Procedure Laterality Date    ESOPHAGOGASTRODUODENOSCOPY N/A 11/19/2019    Procedure: EGD (ESOPHAGOGASTRODUODENOSCOPY);  Surgeon: Kemar Pittman III, MD;  Location: [facility];  Service: Endoscopy;  Laterality: N/A;       Review of patient's allergies indicates:  No Known Allergies  Family History       Problem Relation (Age of Onset)    Arthritis Mother, Father, Maternal Grandmother, Maternal Grandfather, Paternal Grandmother, Paternal Grandfather    Asthma Mother, Maternal Aunt, Maternal Uncle    Cancer Mother, Maternal Aunt, Maternal Grandmother    Diabetes Mother, Father, Maternal Aunt,  Maternal Uncle    Heart disease Maternal Grandmother, Maternal Grandfather    Hypertension Mother, Father, Maternal Aunt, Maternal Uncle, Paternal Aunt, Paternal Uncle, Maternal Grandmother, Maternal Grandfather, Paternal Grandmother, Paternal Grandfather          Tobacco Use    Smoking status: Never    Smokeless tobacco: Never   Substance and Sexual Activity    Alcohol use: No     Comment: social    Drug use: No    Sexual activity: Yes     Review of Systems   Constitutional:  Positive for activity change and fatigue.   HENT: Negative.  Negative for congestion and trouble swallowing.    Eyes:  Negative for pain, discharge and itching.   Respiratory:  Negative for apnea, chest tightness, shortness of breath and stridor.    Cardiovascular:  Positive for chest pain. Negative for palpitations and leg swelling.   Gastrointestinal:  Positive for abdominal pain and nausea. Negative for abdominal distention, anal bleeding, blood in stool, constipation, diarrhea, rectal pain and vomiting.   Endocrine: Negative for cold intolerance, heat intolerance and polydipsia.   Genitourinary:  Positive for flank pain. Negative for difficulty urinating.   Musculoskeletal:  Positive for arthralgias and myalgias.   Skin:  Negative for color change and pallor.   Allergic/Immunologic: Negative for environmental allergies, food allergies and immunocompromised state.   Neurological:  Positive for weakness. Negative for dizziness.   Hematological:  Negative for adenopathy. Does not bruise/bleed easily.   Psychiatric/Behavioral:  Negative for agitation, behavioral problems and confusion.      Objective:     Vital Signs (Most Recent):  Temp: 98.2 °F (36.8 °C) (06/05/24 0403)  Pulse: 68 (06/05/24 0403)  Resp: 14 (06/05/24 0403)  BP: 115/70 (06/05/24 0403)  SpO2: 96 % (06/05/24 0403) Vital Signs (24h Range):  Temp:  [97.5 °F (36.4 °C)-98.2 °F (36.8 °C)] 98.2 °F (36.8 °C)  Pulse:  [68-81] 68  Resp:  [14-18] 14  SpO2:  [96 %-98 %] 96 %  BP:  (115-161)/(59-79) 115/70     Weight: 108.9 kg (240 lb) (06/04/24 1236)  Body mass index is 33.47 kg/m².      Intake/Output Summary (Last 24 hours) at 6/5/2024 0646  Last data filed at 6/4/2024 1734  Gross per 24 hour   Intake 999 ml   Output --   Net 999 ml       Lines/Drains/Airways       Peripheral Intravenous Line  Duration                  Peripheral IV - Single Lumen 06/04/24 1630 20 G Right Antecubital <1 day                    Physical Exam  Vitals and nursing note reviewed.   Constitutional:       General: He is not in acute distress.     Appearance: Normal appearance. He is not ill-appearing.   HENT:      Head: Normocephalic and atraumatic.      Nose: Nose normal. No congestion.      Mouth/Throat:      Mouth: Mucous membranes are moist.      Pharynx: No oropharyngeal exudate.   Eyes:      General: No scleral icterus.     Extraocular Movements: Extraocular movements intact.      Pupils: Pupils are equal, round, and reactive to light.   Cardiovascular:      Rate and Rhythm: Normal rate and regular rhythm.   Pulmonary:      Effort: Pulmonary effort is normal.      Breath sounds: Normal breath sounds.   Abdominal:      General: Abdomen is flat. Bowel sounds are normal.      Palpations: Abdomen is soft.   Musculoskeletal:         General: No swelling or tenderness. Normal range of motion.      Cervical back: Normal range of motion and neck supple.   Skin:     General: Skin is warm and dry.   Neurological:      General: No focal deficit present.      Mental Status: He is alert and oriented to person, place, and time.   Psychiatric:         Mood and Affect: Mood normal.         Thought Content: Thought content normal.         Judgment: Judgment normal.         Significant Labs:  Normal CBC CMP    Significant Imaging:  CT reviewed    Assessment/Plan: This gent has severe upper abdominal new in onset associated with nausea and inability to eat comfortable. His CT scan was carol as were his labs mitigating against  biliary nathaly disease. He has GERD and in the past had ulcerative esophagitis. Suspect he has foregut mucosal disease. Doubt cardiac or pulmonary basis and as such EGD advised and explained in detail. Procedure explained in detail.     There are no hospital problems to display for this patient.          Thank you for your consult.     Dominick Jackson MD  Gastroenterology  [facility] - Emergency Dept (Observation)

## 2024-06-05 NOTE — DISCHARGE SUMMARY
ED Observation Unit  Discharge Summary        History of Present Illness:    Afebrile 29-year-old male with past medical history of esophageal ulcers and gastritis presents the ED for evaluation of upper abdominal pain. Patient reports symptoms began few days ago. He states that pain has been persistent. He does report eating exacerbates it. Does report some decreased appetite. Reports some associated nausea however no vomiting. Initially he thought he was constipated and so took magnesium citrate yesterday and since then began with loose stool. No bloody stools. Denies any recent travel, sick contacts or suspected food contamination. He does report he would history of positive H pylori in 2019 and completed treatment however did not have reassessment with GI. He does report the pain is similar to that episode     Observation Course:    Uneventful    Consultants:    GI    Final Diagnosis:  Patient evaluated by GI in EGD suite.  Findings and recommendations         This gent had an uncomplicated EGD. Findings:  1) Distal ulcerative esophagitis, biopsy  2) Hiatal hernia  3) Antral gastritis, biopsy  Normal duodenum     Await pathology, anti reflux measures.  Pantoprazole 40 mg every morning  Famotidine 40 mg nightly          He is cleared for discharge after postprocedural and recovery monitoring.  He will follow-up with GI in outpatient setting    Discharge Condition: Good    Disposition: Home or Self Care     Time spent on the discharge of the patient including review of hospital course with the patient. reviewing discharge medications and arranging follow-up care 35 minutes.  Patient was seen and examined on the date of discharge and determined to be suitable for discharge.    Follow Up:  No future appointments.

## 2024-06-05 NOTE — TRANSFER OF CARE
Anesthesia Transfer of Care Note    Patient: Clinton Brooks    Procedure(s) Performed: Procedure(s) (LRB):  EGD (ESOPHAGOGASTRODUODENOSCOPY) (N/A)    Patient location: PACU    Anesthesia Type: MAC    Transport from OR: Transported from OR on room air with adequate spontaneous ventilation    Post pain: adequate analgesia    Post assessment: no apparent anesthetic complications and tolerated procedure well    Post vital signs: stable    Level of consciousness: awake, alert and oriented    Nausea/Vomiting: no nausea/vomiting    Complications: none    Transfer of care protocol was followed      Last vitals: Visit Vitals  BP (!) 110/54 (BP Location: Right arm, Patient Position: Lying)   Pulse 86   Temp 36.5 °C (97.7 °F) (Skin)   Resp 16   Wt 108.9 kg (240 lb)   SpO2 99%   BMI 33.47 kg/m²

## 2024-06-05 NOTE — H&P
ED Observation Unit  History and Physical      I assumed care of this patient from the Main ED at onset of observation time, 10:00 pm on 06/04/24      History of Present Illness:    Afebrile 29-year-old male with past medical history of esophageal ulcers and gastritis presents the ED for evaluation of upper abdominal pain. Patient reports symptoms began few days ago. He states that pain has been persistent. He does report eating exacerbates it. Does report some decreased appetite. Reports some associated nausea however no vomiting. Initially he thought he was constipated and so took magnesium citrate yesterday and since then began with loose stool. No bloody stools. Denies any recent travel, sick contacts or suspected food contamination. He does report he would history of positive H pylori in 2019 and completed treatment however did not have reassessment with GI. He does report the pain is similar to that episode     I reviewed the ED Provider Note dated 6/4/24 prior to my evaluation of this patient.  I reviewed all labs and imaging performed in the Main ED, prior to patient being placed in Observation. Patient was placed in the ED Observation Unit for intractable epigastric abdominal pain    PMHx   History reviewed. No pertinent past medical history.   Past Surgical History:   Procedure Laterality Date    ESOPHAGOGASTRODUODENOSCOPY N/A 11/19/2019    Procedure: EGD (ESOPHAGOGASTRODUODENOSCOPY);  Surgeon: Kemar Pittman III, MD;  Location: [facility];  Service: Endoscopy;  Laterality: N/A;        Family Hx   Family History   Problem Relation Name Age of Onset    Arthritis Mother      Asthma Mother      Cancer Mother      Diabetes Mother      Hypertension Mother      Arthritis Father      Diabetes Father      Hypertension Father      Asthma Maternal Aunt      Cancer Maternal Aunt      Diabetes Maternal Aunt      Hypertension Maternal Aunt      Asthma Maternal Uncle      Diabetes Maternal Uncle      Hypertension  Maternal Uncle      Hypertension Paternal Aunt      Hypertension Paternal Uncle      Arthritis Maternal Grandmother      Cancer Maternal Grandmother      Heart disease Maternal Grandmother      Hypertension Maternal Grandmother      Arthritis Maternal Grandfather      Heart disease Maternal Grandfather      Hypertension Maternal Grandfather      Arthritis Paternal Grandmother      Hypertension Paternal Grandmother      Arthritis Paternal Grandfather      Hypertension Paternal Grandfather          Social Hx   Social History     Socioeconomic History    Marital status:    Tobacco Use    Smoking status: Never    Smokeless tobacco: Never   Substance and Sexual Activity    Alcohol use: No     Comment: social    Drug use: No    Sexual activity: Yes        Vital Signs   Vitals:    06/05/24 0403 06/05/24 0827 06/05/24 0830 06/05/24 0845   BP: 115/70 (!) 110/54 (!) 102/49 (!) 107/53   BP Location: Left arm Right arm Right arm Right arm   Patient Position: Lying Lying Lying Lying   Pulse: 68 86 85 66   Resp: 14 16 16 16   Temp: 98.2 °F (36.8 °C) 97.7 °F (36.5 °C)     TempSrc: Oral Skin     SpO2: 96% 99% 99% 97%   Weight:            Review of Systems  As per [facility]    Physical Exam  Physical Exam     Nursing note and vitals reviewed.  Constitutional: Vital signs are normal. He appears well-developed and well-nourished. He is cooperative.   HENT:   Head: Normocephalic and atraumatic.   Eyes: Conjunctivae and lids are normal.   Neck: Trachea normal. No thyroid mass present.   Cardiovascular:  Normal rate and regular rhythm.           Pulmonary/Chest: No respiratory distress. He has no wheezes.   Abdominal: Abdomen is soft. There is abdominal tenderness in the epigastric area and left upper quadrant.   No right CVA tenderness.  No left CVA tenderness. There is no rebound and no guarding.   Musculoskeletal:         General: Normal range of motion.      Neurological: He is alert and oriented to person, place, and time. GCS  score is 15. GCS eye subscore is 4. GCS verbal subscore is 5. GCS motor subscore is 6.   Skin: Skin is warm, dry and intact. No rash noted.   Psychiatric: He has a normal mood and affect. His speech is normal and behavior is normal. Thought content normal.     Medications:   Scheduled Meds:   famotidine (PF)  20 mg Intravenous Q12H     Continuous Infusions:  PRN Meds:.  Current Facility-Administered Medications:     aluminum-magnesium hydroxide-simethicone, 30 mL, Oral, Q4H PRN **AND** [COMPLETED] LIDOcaine viscous HCl 2%, 15 mL, Oral, Once    HYDROmorphone, 0.4 mg, Intravenous, Q5 Min PRN    melatonin, 6 mg, Oral, Nightly PRN    meperidine, 12.5 mg, Intravenous, Once PRN    ondansetron, 4 mg, Intravenous, Q8H PRN    oxyCODONE, 5 mg, Oral, Q3H PRN    prochlorperazine, 5 mg, Intravenous, Q30 Min PRN    sodium chloride 0.9%, 10 mL, Intravenous, PRN    sodium chloride 0.9%, 3 mL, Intravenous, PRN      Assessment/Plan:  1. Abdominal pain, unspecified abdominal location    2. Nausea    3. Intractable abdominal pain      Plan for continued PPI and p.r.n. analgesia  P.r.n. antiemetic  Consult GI given his history of GI ulcers and gastritis with probable plan for EGD    Case was discussed with the ED provider

## 2024-06-05 NOTE — ANESTHESIA PREPROCEDURE EVALUATION
06/05/2024  Clinton Brooks is a 29 y.o., male.      Pre-op Assessment    I have reviewed the Patient Summary Reports.     I have reviewed the Nursing Notes. I have reviewed the NPO Status.   I have reviewed the Medications.     Review of Systems  Anesthesia Hx:  No problems with previous Anesthesia                Social:  Non-Smoker       Hematology/Oncology:  Hematology Normal   Oncology Normal                                   EENT/Dental:  EENT/Dental Normal           Cardiovascular:  Exercise tolerance: good                                           Pulmonary:  Pulmonary Normal                       Hepatic/GI:  Hepatic/GI Normal                 Neurological:  Neurology Normal                                      Endocrine:  Endocrine Normal            Psych:  Psychiatric Normal                    Physical Exam  General: Well nourished, Alert and Cooperative    Airway:  Mallampati: II   Mouth Opening: Normal  TM Distance: Normal  Tongue: Normal  Neck ROM: Normal ROM    Dental:  Intact        Anesthesia Plan  Type of Anesthesia, risks & benefits discussed:    Anesthesia Type: MAC  Intra-op Monitoring Plan: Standard ASA Monitors  Post Op Pain Control Plan: multimodal analgesia  Induction:  IV  Informed Consent: Informed consent signed with the Patient and all parties understand the risks and agree with anesthesia plan.  All questions answered.   ASA Score: 2    Ready For Surgery From Anesthesia Perspective.     .

## 2024-06-05 NOTE — [employer] PATIENT INSTRUCTIONS
Discharge Summary/Instructions after an Endoscopic Procedure  Patient Name: Clinton Brooks  Patient MRN: [id number]  Patient YOB: 1995 Wednesday, June 5, 2024  Dominick Jackson MD  RESTRICTIONS:  During your procedure today, you received medications for sedation.  These   medications may affect your judgment, balance and coordination.  Therefore,   for 24 hours, you have the following restrictions:   - DO NOT drive a car, operate machinery, make legal/financial decisions,   sign important papers or drink alcohol.    ACTIVITY:  Today: no heavy lifting, straining or running due to procedural   sedation/anesthesia.  The following day: return to full activity including work.  DIET:  Eat and drink normally unless instructed otherwise.     TREATMENT FOR COMMON SIDE EFFECTS:  - Mild abdominal pain, nausea, belching, bloating or excessive gas:  rest,   eat lightly and use a heating pad.  - Sore Throat: treat with throat lozenges and/or gargle with warm salt   water.  - Because air was used during the procedure, expelling large amounts of air   from your rectum or belching is normal.  - If a bowel prep was taken, you may not have a bowel movement for 1-3 days.    This is normal.  SYMPTOMS TO WATCH FOR AND REPORT TO YOUR PHYSICIAN:  1. Abdominal pain or bloating, other than gas cramps.  2. Chest pain.  3. Back pain.  4. Signs of infection such as: chills or fever occurring within 24 hours   after the procedure.  5. Rectal bleeding, which would show as bright red, maroon, or black stools.   (A tablespoon of blood from the rectum is not serious, especially if   hemorrhoids are present.)  6. Vomiting.  7. Weakness or dizziness.  GO DIRECTLY TO THE NEAREST EMERGENCY ROOM IF YOU HAVE ANY OF THE FOLLOWING:      Difficulty breathing              Chills and/or fever over 101 F   Persistent vomiting and/or vomiting blood   Severe abdominal pain   Severe chest pain   Black, tarry stools   Bleeding- more than one  tablespoon   Any other symptom or condition that you feel may need urgent attention  Your doctor recommends these additional instructions:  If any biopsies were taken, your doctors clinic will contact you in 1 to 2   weeks with any results.  - Return patient to hospital [facility] for ongoing care.   - Advance diet as tolerated.   - Continue present medications.   - Await pathology results.   - Observe patient's clinical course.  For questions, problems or results please call your physician - Dominick Jackson MD at Work:  [phone].  [facility], EMERGENCY ROOM PHONE NUMBER: [phone], [facility]   [phone].  IF A COMPLICATION OR EMERGENCY SITUATION ARISES AND YOU ARE UNABLE TO REACH   YOUR PHYSICIAN - GO DIRECTLY TO THE EMERGENCY ROOM.  Dominick Jackson MD  6/5/2024 8:26:16 AM  This report has been verified and signed electronically.  Dear patient,  As a result of recent federal legislation (The Federal Cures Act), you may   receive lab or pathology results from your procedure in your [facility]   account before your physician is able to contact you. Your physician or   their representative will relay the results to you with their   recommendations at their soonest availability.  Thank you,  [employer]

## 2024-06-05 NOTE — ANESTHESIA POSTPROCEDURE EVALUATION
Anesthesia Post Evaluation    Patient: Clinton Brooks    Procedure(s) Performed: Procedure(s) (LRB):  EGD (ESOPHAGOGASTRODUODENOSCOPY) (N/A)    Final Anesthesia Type: MAC      Patient location during evaluation: PACU  Patient participation: Yes- Able to Participate  Level of consciousness: awake and alert  Post-procedure vital signs: reviewed and stable  Pain management: adequate  Airway patency: patent    PONV status at discharge: No PONV  Anesthetic complications: no      Cardiovascular status: blood pressure returned to baseline  Respiratory status: unassisted  Hydration status: euvolemic  Follow-up not needed.              Vitals Value Taken Time   /69 06/05/24 1000   Temp 36.7 °C (98 °F) 06/05/24 0945   Pulse 67 06/05/24 1000   Resp 18 06/05/24 1000   SpO2 100 % 06/05/24 1000         Event Time   Out of Recovery 06/05/2024 09:38:29         Pain/Ella Score: Pain Rating Prior to Med Admin: 5 (6/4/2024  8:18 PM)  Pain Rating Post Med Admin: 6 (6/4/2024  8:48 PM)  Ella Score: 10 (6/5/2024 10:00 AM)

## 2024-06-05 NOTE — ED PROVIDER NOTES
Encounter Date: 6/4/2024       History     Chief Complaint   Patient presents with    Abdominal Pain     Reports abd pain onset 3 days ago. +diarrhea.      Afebrile 29-year-old male with past medical history of esophageal ulcers and gastritis presents the ED for evaluation of upper abdominal pain.  Patient reports symptoms began few days ago.  He states that pain has been persistent.  He does report eating exacerbates it.  Does report some decreased appetite.  Reports some associated nausea however no vomiting.  Initially he thought he was constipated and so took magnesium citrate yesterday and since then began with loose stool.  No bloody stools.  Denies any recent travel, sick contacts or suspected food contamination.  He does report he would history of positive H pylori in 2019 and completed treatment however did not have reassessment with GI.  He does report the pain is similar to that episode    The history is provided by the patient and medical records.     Review of patient's allergies indicates:  No Known Allergies  No past medical history on file.  Past Surgical History:   Procedure Laterality Date    ESOPHAGOGASTRODUODENOSCOPY N/A 11/19/2019    Procedure: EGD (ESOPHAGOGASTRODUODENOSCOPY);  Surgeon: Kemar Pittman III, MD;  Location: [facility];  Service: Endoscopy;  Laterality: N/A;     Family History   Problem Relation Name Age of Onset    Arthritis Mother      Asthma Mother      Cancer Mother      Diabetes Mother      Hypertension Mother      Arthritis Father      Diabetes Father      Hypertension Father      Asthma Maternal Aunt      Cancer Maternal Aunt      Diabetes Maternal Aunt      Hypertension Maternal Aunt      Asthma Maternal Uncle      Diabetes Maternal Uncle      Hypertension Maternal Uncle      Hypertension Paternal Aunt      Hypertension Paternal Uncle      Arthritis Maternal Grandmother      Cancer Maternal Grandmother      Heart disease Maternal Grandmother      Hypertension Maternal  Grandmother      Arthritis Maternal Grandfather      Heart disease Maternal Grandfather      Hypertension Maternal Grandfather      Arthritis Paternal Grandmother      Hypertension Paternal Grandmother      Arthritis Paternal Grandfather      Hypertension Paternal Grandfather       Social History     Tobacco Use    Smoking status: Never    Smokeless tobacco: Never   Substance Use Topics    Alcohol use: No     Comment: social    Drug use: No     Review of Systems  As per HPI  Physical Exam     Initial Vitals [06/04/24 1236]   BP Pulse Resp Temp SpO2   (!) 161/79 81 16 98.1 °F (36.7 °C) 97 %      MAP       --         Physical Exam    Nursing note and vitals reviewed.  Constitutional: Vital signs are normal. He appears well-developed and well-nourished. He is cooperative.   HENT:   Head: Normocephalic and atraumatic.   Eyes: Conjunctivae and lids are normal.   Neck: Trachea normal. No thyroid mass present.   Cardiovascular:  Normal rate and regular rhythm.           Pulmonary/Chest: No respiratory distress. He has no wheezes.   Abdominal: Abdomen is soft. There is abdominal tenderness in the epigastric area and left upper quadrant.   No right CVA tenderness.  No left CVA tenderness. There is no rebound and no guarding.   Musculoskeletal:         General: Normal range of motion.     Neurological: He is alert and oriented to person, place, and time. GCS score is 15. GCS eye subscore is 4. GCS verbal subscore is 5. GCS motor subscore is 6.   Skin: Skin is warm, dry and intact. No rash noted.   Psychiatric: He has a normal mood and affect. His speech is normal and behavior is normal. Thought content normal.         ED Course   Procedures  Labs Reviewed   CBC W/ AUTO DIFFERENTIAL - Abnormal; Notable for the following components:       Result Value    MCV 80 (*)     MCH 26.4 (*)     Eos # 0.8 (*)     All other components within normal limits   URINALYSIS, REFLEX TO URINE CULTURE - Abnormal; Notable for the following  components:    Protein, UA Trace (*)     Ketones, UA 1+ (*)     All other components within normal limits    Narrative:     Specimen Source->Urine   COMPREHENSIVE METABOLIC PANEL   LIPASE   ISTAT CREATININE          Imaging Results              CT Abdomen Pelvis With IV Contrast NO Oral Contrast (Final result)  Result time 06/04/24 19:46:26      Final result by Haresh Hawkins DO (06/04/24 19:46:26)                   Impression:      No acute abnormality of the abdomen or pelvis.      Electronically signed by: Haresh Hawkins  Date:    06/04/2024  Time:    19:46               Narrative:    EXAMINATION:  CT ABDOMEN PELVIS WITH IV CONTRAST    CLINICAL HISTORY:  Epigastric pain;    TECHNIQUE:  Axial CT images with sagittal and coronal reformats were obtained of the abdomen and pelvis from the hemidiaphragms through the symphysis pubis after the administration of 100mL Omnipaque 350.    COMPARISON:  CT of the abdomen and pelvis from 10/26/2022    FINDINGS:  Lung Bases: Clear.    Heart: Heart size is normal.  No pericardial effusion.    Liver: The liver is normal in size and demonstrates homogeneous enhancement without focal lesion.  The portal vasculature is patent.    Biliary tract: No intrahepatic or extrahepatic biliary ductal dilatation.    Gallbladder: No radiodense gallstone. No wall thickening or pericholecystic fluid.    Pancreas: Normal. No pancreatic ductal dilatation.    Spleen: Normal size without focal lesion.    Adrenals: Unremarkable.    Kidneys and urinary collecting systems: Normal.  No hydronephrosis or urolithiasis.    Lymph nodes: Mildly prominent mesenteric lymph nodes again noted, stable.    Stomach and bowel: The stomach is normal.  Loops of small and large bowel are normal in caliber without evidence for inflammation or obstruction.  The appendix is normal.    Peritoneum and mesentery: No ascites or free intraperitoneal air.  No abdominal fluid collection.    Vasculature: No aneurysm or  significant atherosclerosis.    Urinary bladder: No wall thickening.    Reproductive organs: The prostate is normal.    Body wall: No abnormality.    Musculoskeletal: No aggressive osseous lesion.                                       Medications   0.9%  NaCl infusion (1,000 mLs Intravenous New Bag 6/4/24 2032)   sodium chloride 0.9% bolus 1,000 mL 1,000 mL (0 mLs Intravenous Stopped 6/4/24 1734)   pantoprazole injection 40 mg (40 mg Intravenous Given 6/4/24 1657)   aluminum-magnesium hydroxide-simethicone 200-200-20 mg/5 mL suspension 30 mL (30 mLs Oral Given 6/4/24 1642)     And   LIDOcaine viscous HCl 2% oral solution 15 mL (15 mLs Oral Given 6/4/24 1642)   morphine injection 4 mg (4 mg Intravenous Given 6/4/24 2018)   iohexoL (OMNIPAQUE 350) injection 100 mL (100 mLs Intravenous Given 6/4/24 1928)     Medical Decision Making  Amount and/or Complexity of Data Reviewed  Radiology: ordered.    Risk  OTC drugs.  Prescription drug management.               ED Course as of 06/04/24 2113 Tue Jun 04, 2024   1548 The EGD results from 2019 which did reveal few esophageal ulcers and nonerosive gastritis.  Unsure of H pylori results at that time [LC]      ED Course User Index  [LC] Delia Navarrete PA               Medical Decision Making:   History:   I obtained history from: someone other than patient.  Old Medical Records: I decided to obtain old medical records.  Old Records Summarized: records from another hospital.  Initial Assessment:   Emergent evaluation 29-year-old male presenting to the ED for evaluation of upper abdominal pain.  He appears uncomfortable however nontoxic.  Exam notable for mildly dry mucous membranes.  TTP of epigastric and left upper quadrant.  No guarding, rebound or rigidity.  Differential Diagnosis:   Differential Diagnosis includes, but is not limited to:   mesenteric ischemia, perforated viscous,SBO/volvulus, incarcerated/strangulated hernia, intussusception, ileus, appendicitis,  cholecystitis, cholangitis, diverticulitis, esophagitis, hepatitis, nephrolithiasis, pancreatitis, gastroenteritis, colitis, IBD/IBS, biliary colic, GERD, PUD, constipation, UTI/pyelonephritis,  disorder.      Clinical Tests:   Lab Tests: Reviewed and Ordered  Radiological Study: Reviewed and Ordered  ED Management:  Plan for labs, IV fluids and analgesics given location of pain and patient's previous history of esophageal ulcers and gastritis will give Protonix and GI cocktail.  On reassessment he reported modest improvement symptoms.  Labs were reassuring and consistent with some mild dehydration with ketones in urine.  Lipase normal.  Chemistry unremarkable.  Given the persistent pain will obtain CT abdomen.  CT is unremarkable.  On reassessment again he reports continued pain.  Will place in observation for continued monitoring, analgesia and GI consult in the morning        Clinical Impression:  Final diagnoses:  [R10.9] Abdominal pain, unspecified abdominal location (Primary)  [R11.0] Nausea  [R10.9] Intractable abdominal pain          ED Disposition Condition    Observation Stable                Delia Navarrete PA  06/04/24 0579

## 2024-06-05 NOTE — PLAN OF CARE
Clinton Brooks has met all discharge criteria from Phase II. Vital Signs are stable, ambulating  without difficulty. Discharge instructions given, patient verbalized understanding. Discharged from facility via wheelchair in stable condition.

## 2024-06-05 NOTE — PROGRESS NOTES
This gent had an uncomplicated EGD. Findings:  1) Distal ulcerative esophagitis, biopsy  2) Hiatal hernia  3) Antral gastritis, biopsy  Normal duodenum    Await pathology, anti reflux measures.  Pantoprazole 40 mg every morning  Famotidine 40 mg nightly    OP follow up

## 2024-06-08 ENCOUNTER — HOSPITAL ENCOUNTER (EMERGENCY)
Facility: OTHER | Age: 29
Discharge: HOME | End: 2024-06-08
Attending: EMERGENCY MEDICINE

## 2024-06-08 VITALS
BODY MASS INDEX: 34.36 KG/M2 | RESPIRATION RATE: 16 BRPM | SYSTOLIC BLOOD PRESSURE: 126 MMHG | DIASTOLIC BLOOD PRESSURE: 64 MMHG | WEIGHT: 240 LBS | HEART RATE: 69 BPM | HEIGHT: 70 IN | OXYGEN SATURATION: 99 % | TEMPERATURE: 99 F

## 2024-06-08 DIAGNOSIS — K29.70 GASTRITIS, PRESENCE OF BLEEDING UNSPECIFIED, UNSPECIFIED CHRONICITY, UNSPECIFIED GASTRITIS TYPE: ICD-10-CM

## 2024-06-08 DIAGNOSIS — R10.12 LEFT UPPER QUADRANT ABDOMINAL PAIN: Primary | ICD-10-CM

## 2024-06-08 LAB
ALBUMIN SERPL BCP-MCNC: 4.1 G/DL (ref 3.5–5.2)
ALP SERPL-CCNC: 75 U/L (ref 55–135)
ALT SERPL W/O P-5'-P-CCNC: 20 U/L (ref 10–44)
ANION GAP SERPL CALC-SCNC: 9 MMOL/L (ref 8–16)
AST SERPL-CCNC: 20 U/L (ref 10–40)
BASOPHILS # BLD AUTO: 0.03 K/UL (ref 0–0.2)
BASOPHILS NFR BLD: 0.3 % (ref 0–1.9)
BILIRUB SERPL-MCNC: 0.3 MG/DL (ref 0.1–1)
BILIRUB UR QL STRIP: NEGATIVE
BUN SERPL-MCNC: 14 MG/DL (ref 6–20)
CALCIUM SERPL-MCNC: 9.1 MG/DL (ref 8.7–10.5)
CHLORIDE SERPL-SCNC: 105 MMOL/L (ref 95–110)
CLARITY UR: CLEAR
CO2 SERPL-SCNC: 23 MMOL/L (ref 23–29)
COLOR UR: YELLOW
CREAT SERPL-MCNC: 1.1 MG/DL (ref 0.5–1.4)
DIFFERENTIAL METHOD BLD: ABNORMAL
EOSINOPHIL # BLD AUTO: 1.8 K/UL (ref 0–0.5)
EOSINOPHIL NFR BLD: 16.3 % (ref 0–8)
ERYTHROCYTE [DISTWIDTH] IN BLOOD BY AUTOMATED COUNT: 13 % (ref 11.5–14.5)
EST. GFR  (NO RACE VARIABLE): >60 ML/MIN/1.73 M^2
GLUCOSE SERPL-MCNC: 103 MG/DL (ref 70–110)
GLUCOSE UR QL STRIP: NEGATIVE
HCT VFR BLD AUTO: 45.3 % (ref 40–54)
HGB BLD-MCNC: 15.2 G/DL (ref 14–18)
HGB UR QL STRIP: NEGATIVE
IMM GRANULOCYTES # BLD AUTO: 0.08 K/UL (ref 0–0.04)
IMM GRANULOCYTES NFR BLD AUTO: 0.7 % (ref 0–0.5)
KETONES UR QL STRIP: NEGATIVE
LEUKOCYTE ESTERASE UR QL STRIP: NEGATIVE
LIPASE SERPL-CCNC: 23 U/L (ref 4–60)
LYMPHOCYTES # BLD AUTO: 3.1 K/UL (ref 1–4.8)
LYMPHOCYTES NFR BLD: 27.5 % (ref 18–48)
MCH RBC QN AUTO: 26.6 PG (ref 27–31)
MCHC RBC AUTO-ENTMCNC: 33.6 G/DL (ref 32–36)
MCV RBC AUTO: 79 FL (ref 82–98)
MONOCYTES # BLD AUTO: 0.6 K/UL (ref 0.3–1)
MONOCYTES NFR BLD: 5.7 % (ref 4–15)
NEUTROPHILS # BLD AUTO: 5.5 K/UL (ref 1.8–7.7)
NEUTROPHILS NFR BLD: 49.5 % (ref 38–73)
NITRITE UR QL STRIP: NEGATIVE
NRBC BLD-RTO: 0 /100 WBC
PH UR STRIP: 7 [PH] (ref 5–8)
PLATELET # BLD AUTO: 219 K/UL (ref 150–450)
PMV BLD AUTO: 10.5 FL (ref 9.2–12.9)
POTASSIUM SERPL-SCNC: 3.7 MMOL/L (ref 3.5–5.1)
PROT SERPL-MCNC: 7.3 G/DL (ref 6–8.4)
PROT UR QL STRIP: NEGATIVE
RBC # BLD AUTO: 5.71 M/UL (ref 4.6–6.2)
SODIUM SERPL-SCNC: 137 MMOL/L (ref 136–145)
SP GR UR STRIP: 1.02 (ref 1–1.03)
URN SPEC COLLECT METH UR: NORMAL
UROBILINOGEN UR STRIP-ACNC: NEGATIVE EU/DL
WBC # BLD AUTO: 11.18 K/UL (ref 3.9–12.7)

## 2024-06-08 PROCEDURE — 96372 THER/PROPH/DIAG INJ SC/IM: CPT | Performed by: NURSE PRACTITIONER

## 2024-06-08 PROCEDURE — 25000003 PHARM REV CODE 250: Performed by: PHYSICIAN ASSISTANT

## 2024-06-08 PROCEDURE — 25000003 PHARM REV CODE 250: Performed by: NURSE PRACTITIONER

## 2024-06-08 PROCEDURE — 83690 ASSAY OF LIPASE: CPT | Performed by: PHYSICIAN ASSISTANT

## 2024-06-08 PROCEDURE — 81003 URINALYSIS AUTO W/O SCOPE: CPT | Performed by: PHYSICIAN ASSISTANT

## 2024-06-08 PROCEDURE — 85025 COMPLETE CBC W/AUTO DIFF WBC: CPT | Performed by: PHYSICIAN ASSISTANT

## 2024-06-08 PROCEDURE — 63600175 PHARM REV CODE 636 W HCPCS: Performed by: NURSE PRACTITIONER

## 2024-06-08 PROCEDURE — 80053 COMPREHEN METABOLIC PANEL: CPT | Performed by: PHYSICIAN ASSISTANT

## 2024-06-08 PROCEDURE — 99284 EMERGENCY DEPT VISIT MOD MDM: CPT | Mod: 25

## 2024-06-08 RX ORDER — ONDANSETRON HYDROCHLORIDE 2 MG/ML
4 INJECTION, SOLUTION INTRAVENOUS
Status: DISCONTINUED | OUTPATIENT
Start: 2024-06-08 | End: 2024-06-08

## 2024-06-08 RX ORDER — ALUMINUM HYDROXIDE, MAGNESIUM HYDROXIDE, AND SIMETHICONE 1200; 120; 1200 MG/30ML; MG/30ML; MG/30ML
30 SUSPENSION ORAL ONCE
Status: COMPLETED | OUTPATIENT
Start: 2024-06-08 | End: 2024-06-08

## 2024-06-08 RX ORDER — KETOROLAC TROMETHAMINE 30 MG/ML
15 INJECTION, SOLUTION INTRAMUSCULAR; INTRAVENOUS
Status: COMPLETED | OUTPATIENT
Start: 2024-06-08 | End: 2024-06-08

## 2024-06-08 RX ORDER — LIDOCAINE HYDROCHLORIDE 20 MG/ML
15 SOLUTION OROPHARYNGEAL ONCE
Status: COMPLETED | OUTPATIENT
Start: 2024-06-08 | End: 2024-06-08

## 2024-06-08 RX ORDER — LIDOCAINE HYDROCHLORIDE 20 MG/ML
SOLUTION OROPHARYNGEAL
Qty: 100 ML | Refills: 0 | Status: SHIPPED | OUTPATIENT
Start: 2024-06-08

## 2024-06-08 RX ORDER — FAMOTIDINE 20 MG/1
40 TABLET, FILM COATED ORAL
Status: COMPLETED | OUTPATIENT
Start: 2024-06-08 | End: 2024-06-08

## 2024-06-08 RX ORDER — KETOROLAC TROMETHAMINE 30 MG/ML
15 INJECTION, SOLUTION INTRAMUSCULAR; INTRAVENOUS
Status: DISCONTINUED | OUTPATIENT
Start: 2024-06-08 | End: 2024-06-08

## 2024-06-08 RX ORDER — FAMOTIDINE 10 MG/ML
20 INJECTION INTRAVENOUS
Status: DISCONTINUED | OUTPATIENT
Start: 2024-06-08 | End: 2024-06-08

## 2024-06-08 RX ORDER — SUCRALFATE 1 G/10ML
1 SUSPENSION ORAL 4 TIMES DAILY
Qty: 414 ML | Refills: 0 | Status: SHIPPED | OUTPATIENT
Start: 2024-06-08

## 2024-06-08 RX ORDER — ONDANSETRON 4 MG/1
4 TABLET, ORALLY DISINTEGRATING ORAL
Status: COMPLETED | OUTPATIENT
Start: 2024-06-08 | End: 2024-06-08

## 2024-06-08 RX ORDER — SUCRALFATE 1 G/10ML
1 SUSPENSION ORAL
Status: COMPLETED | OUTPATIENT
Start: 2024-06-08 | End: 2024-06-08

## 2024-06-08 RX ADMIN — ONDANSETRON 4 MG: 4 TABLET, ORALLY DISINTEGRATING ORAL at 03:06

## 2024-06-08 RX ADMIN — FAMOTIDINE 40 MG: 20 TABLET ORAL at 04:06

## 2024-06-08 RX ADMIN — KETOROLAC TROMETHAMINE 15 MG: 30 INJECTION, SOLUTION INTRAMUSCULAR; INTRAVENOUS at 04:06

## 2024-06-08 RX ADMIN — LIDOCAINE HYDROCHLORIDE 15 ML: 20 SOLUTION ORAL at 04:06

## 2024-06-08 RX ADMIN — SUCRALFATE 1 G: 1 SUSPENSION ORAL at 05:06

## 2024-06-08 RX ADMIN — ALUMINUM HYDROXIDE, MAGNESIUM HYDROXIDE, AND SIMETHICONE 30 ML: 1200; 120; 1200 SUSPENSION ORAL at 04:06

## 2024-06-08 NOTE — FIRST PROVIDER EVALUATION
Emergency Department TeleTriage Encounter Note      CHIEF COMPLAINT    Chief Complaint   Patient presents with    Abdominal Pain     LUQ abdominal pain radiating to back x 7 days. Was seen at ED and had CT and endoscopy 3 days ago with no abnormal results. Taking protonix and pepcid at home with no relief. Also c/o nausea and vomiting.       VITAL SIGNS   Initial Vitals [06/08/24 1407]   BP Pulse Resp Temp SpO2   (!) 141/78 70 18 98.2 °F (36.8 °C) 98 %      MAP       --            ALLERGIES    Review of patient's allergies indicates:  No Known Allergies    PROVIDER TRIAGE NOTE  Patient presents with left mid-back pain, nausea and upper abdominal pain. Pain is worse with palpation of back. He has recently had EGD, CT and labs. He has been taking protonix without improvement.       ORDERS  Labs Reviewed - No data to display    ED Orders (720h ago, onward)      None              Virtual Visit Note: The provider triage portion of this emergency department evaluation and documentation was performed via [employer], a HIPAA-compliant telemedicine application, in concert with a tele-presenter in the room. A face to face patient evaluation with one of my colleagues will occur once the patient is placed in an emergency department room.      DISCLAIMER: This note was prepared with [employer] voice recognition transcription software. Garbled syntax, mangled pronouns, and other bizarre constructions may be attributed to that software system.

## 2024-06-08 NOTE — ED PROVIDER NOTES
"     Source of History:  Patient    Chief complaint:  Abdominal Pain (LUQ abdominal pain radiating to back x 7 days. Was seen at ED and had CT and endoscopy 3 days ago with no abnormal results. Taking protonix and pepcid at home with no relief. Also c/o nausea and vomiting.)      HPI:  Clinton Brooks is a 29 y.o. male presenting with left upper quadrant abdominal pain that is radiating towards his back.  Patient was evaluated 6/4/24 for the same plant in this ER.  He was placed in observation and had an EGD.  He was diagnosed with ulcerative esophagitis as well as gastritis 6/5.  He was started on Protonix 40 mg every morning and Pepcid 40 mg at night.  He reports compliance with these medications.  He is awaiting pathology to follow-up with GI.  Patient states that since discharge he has had continued pain despite compliance to medication.  Patient is also reporting mild nausea and 1 episode of nonbloody emesis.    This is the extent to the patients complaints today here in the emergency department.    ROS: As per HPI     Review of patient's allergies indicates:  No Known Allergies    PMH:  As per HPI and below:  No past medical history on file.  Past Surgical History:   Procedure Laterality Date    ESOPHAGOGASTRODUODENOSCOPY N/A 11/19/2019    Procedure: EGD (ESOPHAGOGASTRODUODENOSCOPY);  Surgeon: Kemar Pittman III, MD;  Location: [facility];  Service: Endoscopy;  Laterality: N/A;    ESOPHAGOGASTRODUODENOSCOPY N/A 6/5/2024    Procedure: EGD (ESOPHAGOGASTRODUODENOSCOPY);  Surgeon: Dominick Jackson MD;  Location: [facility];  Service: Endoscopy;  Laterality: N/A;       Social History     Tobacco Use    Smoking status: Never    Smokeless tobacco: Never   Substance Use Topics    Alcohol use: No     Comment: social    Drug use: No       Physical Exam:    /64 (Patient Position: Lying)   Pulse 69   Temp 99 °F (37.2 °C) (Oral)   Resp 16   Ht 5' 10" (1.778 m)   Wt 108.9 kg (240 lb)   SpO2 99%   BMI 34.44 " kg/m²   Nursing note and vital signs reviewed.  Appearance: No acute distress.  Eyes: No conjunctival injection.  ENT: Oropharynx clear.    Chest/ Respiratory: Clear to auscultation bilaterally.  Good air movement.  No wheezes.  No rhonchi. No rales. No accessory muscle use.  Cardiovascular: Regular rate and rhythm.  No murmurs. No gallops. No rubs.  Abdomen: Soft.  Not distended.  Mild tenderness in the epigastrium and left upper quadrant.  No guarding.  No rebound. Non-peritoneal.  Musculoskeletal: Good range of motion all joints.  No deformities.  Neck supple.  No meningismus.  Skin: No rashes seen.  Good turgor.  No abrasions.  No ecchymoses.  Neurologic: Motor intact.  Sensation intact.  Cerebellar intact.  Cranial nerves intact.  Mental Status:  Alert and oriented x 3.  Appropriate, conversant    Labs that have been ordered have been independently reviewed and interpreted by myself.    I decided to obtain the patient's medical records.  Summary of Medical Records:  EGD 6/5/24    Patient evaluated by GI in EGD suite.  Findings and recommendations           This gent had an uncomplicated EGD. Findings:  1) Distal ulcerative esophagitis, biopsy  2) Hiatal hernia  3) Antral gastritis, biopsy  Normal duodenum     Await pathology, anti reflux measures.  Pantoprazole 40 mg every morning  Famotidine 40 mg nightly            He is cleared for discharge after postprocedural and recovery monitoring.  He will follow-up with GI in outpatient setting      Imaging Results    None         Initial Impression/ Differential Dx:  Urgent evaluation of 29 y.o. male presenting with abdominal pain, nausea and vomiting. Patient is afebrile, not toxic appearing and hemodynamically stable.  On exam patient with mild tenderness in the epigastrium and left upper quadrant.  I suspect that this has secondary to esophagitis and gastritis.  I do not feel like repeat imaging is indicated at this time as patient literally had a CT scan and an  EGD a few days ago.  No pain in the right upper quadrant to suggest cholelithiasis or cholecystitis and once again patient had negative imaging a few days ago.  Will treat with GI cocktail, Zofran Pepcid and Toradol and continue to reassess.    Differential Diagnosis includes, but is not limited to:  GERD, gastritis, PUD, H pylori, pancreatitis    MDM:        ED Course as of 06/08/24 2314   Sat Jun 08, 2024   1750 Lab work is grossly unremarkable.  No evidence of infection or dehydration on UA.    At bedside to reassess patient.  He states that he is feeling significantly better but he is still having some discomfort and bloating.  Will trial Carafate. [CU]   1831 At bedside to reassess patient.  He reports resolution of his abdominal pain.  Will discharge patient home with Carafate and viscous lidocaine to take as needed for treatment of gastritis while he awaits biopsy results from GI.  Counseled to follow-up with his gastroenterologist. Patient educated on signs and symptoms to monitor for and when to return to ED. Patient verbalized understanding agrees with treatment plan. All questions and concerns addressed.      [CU]      ED Course User Index  [CU] Florian Prasad NP                   Diagnostic Impression:    1. Left upper quadrant abdominal pain    2. Gastritis, presence of bleeding unspecified, unspecified chronicity, unspecified gastritis type         ED Disposition Condition    Discharge Good            ED Prescriptions       Medication Sig Dispense Start Date End Date Auth. Provider    sucralfate (CARAFATE) 100 mg/mL suspension Take 10 mLs (1 g total) by mouth 4 (four) times daily. 414 mL 6/8/2024 -- Florian Prasad NP    LIDOcaine viscous HCl 2% (LIDOCAINE VISCOUS) 2 % Soln Swallow 15ml every 8 hours as needed for pain if unrelieved by other medications 100 mL 6/8/2024 -- Florian Prasad NP          Follow-up Information       Follow up With Specialties Details Why Contact Info    Your  gastroenterologist                 Florian Prasad, MONTEZ  06/08/24 4884

## 2024-06-10 LAB
FINAL PATHOLOGIC DIAGNOSIS: NORMAL
GROSS: NORMAL
Lab: NORMAL
MICROSCOPIC EXAM: NORMAL

## 2024-06-11 ENCOUNTER — HOSPITAL ENCOUNTER (OUTPATIENT)
Facility: OTHER | Age: 29
Discharge: HOME | End: 2024-06-12
Attending: EMERGENCY MEDICINE | Admitting: EMERGENCY MEDICINE

## 2024-06-11 ENCOUNTER — NURSE TRIAGE (OUTPATIENT)
Dept: ADMINISTRATIVE | Facility: CLINIC | Age: 29
End: 2024-06-11

## 2024-06-11 DIAGNOSIS — K20.90 ESOPHAGITIS: Primary | ICD-10-CM

## 2024-06-11 PROBLEM — R11.2 NAUSEA AND VOMITING: Status: ACTIVE | Noted: 2024-06-11

## 2024-06-11 LAB
BASOPHILS # BLD AUTO: 0.05 K/UL (ref 0–0.2)
BASOPHILS NFR BLD: 0.4 % (ref 0–1.9)
DIFFERENTIAL METHOD BLD: ABNORMAL
EOSINOPHIL # BLD AUTO: 2.6 K/UL (ref 0–0.5)
EOSINOPHIL NFR BLD: 21 % (ref 0–8)
ERYTHROCYTE [DISTWIDTH] IN BLOOD BY AUTOMATED COUNT: 13 % (ref 11.5–14.5)
HCT VFR BLD AUTO: 48 % (ref 40–54)
HGB BLD-MCNC: 15.9 G/DL (ref 14–18)
IMM GRANULOCYTES # BLD AUTO: 0.04 K/UL (ref 0–0.04)
IMM GRANULOCYTES NFR BLD AUTO: 0.3 % (ref 0–0.5)
LYMPHOCYTES # BLD AUTO: 3 K/UL (ref 1–4.8)
LYMPHOCYTES NFR BLD: 24.6 % (ref 18–48)
MCH RBC QN AUTO: 26.4 PG (ref 27–31)
MCHC RBC AUTO-ENTMCNC: 33.1 G/DL (ref 32–36)
MCV RBC AUTO: 80 FL (ref 82–98)
MONOCYTES # BLD AUTO: 0.6 K/UL (ref 0.3–1)
MONOCYTES NFR BLD: 5.2 % (ref 4–15)
NEUTROPHILS # BLD AUTO: 5.9 K/UL (ref 1.8–7.7)
NEUTROPHILS NFR BLD: 48.5 % (ref 38–73)
NRBC BLD-RTO: 0 /100 WBC
PLATELET # BLD AUTO: 216 K/UL (ref 150–450)
PLATELET BLD QL SMEAR: ABNORMAL
PMV BLD AUTO: 11.1 FL (ref 9.2–12.9)
RBC # BLD AUTO: 6.03 M/UL (ref 4.6–6.2)
WBC # BLD AUTO: 12.16 K/UL (ref 3.9–12.7)

## 2024-06-11 PROCEDURE — G0378 HOSPITAL OBSERVATION PER HR: HCPCS

## 2024-06-11 PROCEDURE — 85025 COMPLETE CBC W/AUTO DIFF WBC: CPT | Performed by: NURSE PRACTITIONER

## 2024-06-11 PROCEDURE — 99285 EMERGENCY DEPT VISIT HI MDM: CPT | Mod: 25

## 2024-06-11 PROCEDURE — 63600175 PHARM REV CODE 636 W HCPCS: Performed by: EMERGENCY MEDICINE

## 2024-06-11 PROCEDURE — 96375 TX/PRO/DX INJ NEW DRUG ADDON: CPT

## 2024-06-11 PROCEDURE — 96374 THER/PROPH/DIAG INJ IV PUSH: CPT

## 2024-06-11 PROCEDURE — C9113 INJ PANTOPRAZOLE SODIUM, VIA: HCPCS | Performed by: NURSE PRACTITIONER

## 2024-06-11 PROCEDURE — 25000003 PHARM REV CODE 250: Performed by: NURSE PRACTITIONER

## 2024-06-11 PROCEDURE — 63600175 PHARM REV CODE 636 W HCPCS: Performed by: NURSE PRACTITIONER

## 2024-06-11 PROCEDURE — 96361 HYDRATE IV INFUSION ADD-ON: CPT

## 2024-06-11 RX ORDER — DROPERIDOL 2.5 MG/ML
0.62 INJECTION, SOLUTION INTRAMUSCULAR; INTRAVENOUS ONCE
Status: DISCONTINUED | OUTPATIENT
Start: 2024-06-11 | End: 2024-06-11

## 2024-06-11 RX ORDER — KETOROLAC TROMETHAMINE 30 MG/ML
15 INJECTION, SOLUTION INTRAMUSCULAR; INTRAVENOUS
Status: COMPLETED | OUTPATIENT
Start: 2024-06-11 | End: 2024-06-11

## 2024-06-11 RX ORDER — DROPERIDOL 2.5 MG/ML
0.62 INJECTION, SOLUTION INTRAMUSCULAR; INTRAVENOUS
Status: COMPLETED | OUTPATIENT
Start: 2024-06-11 | End: 2024-06-11

## 2024-06-11 RX ORDER — PANTOPRAZOLE SODIUM 40 MG/10ML
40 INJECTION, POWDER, LYOPHILIZED, FOR SOLUTION INTRAVENOUS
Status: COMPLETED | OUTPATIENT
Start: 2024-06-11 | End: 2024-06-11

## 2024-06-11 RX ORDER — SODIUM CHLORIDE 0.9 % (FLUSH) 0.9 %
10 SYRINGE (ML) INJECTION
Status: DISCONTINUED | OUTPATIENT
Start: 2024-06-11 | End: 2024-06-12 | Stop reason: HOSPADM

## 2024-06-11 RX ORDER — ONDANSETRON HYDROCHLORIDE 2 MG/ML
4 INJECTION, SOLUTION INTRAVENOUS EVERY 6 HOURS PRN
Status: DISCONTINUED | OUTPATIENT
Start: 2024-06-11 | End: 2024-06-12 | Stop reason: HOSPADM

## 2024-06-11 RX ORDER — DEXTROSE MONOHYDRATE, SODIUM CHLORIDE, AND POTASSIUM CHLORIDE 50; 1.49; 4.5 G/1000ML; G/1000ML; G/1000ML
INJECTION, SOLUTION INTRAVENOUS CONTINUOUS
Status: DISCONTINUED | OUTPATIENT
Start: 2024-06-11 | End: 2024-06-12 | Stop reason: HOSPADM

## 2024-06-11 RX ORDER — DEXTROSE MONOHYDRATE, SODIUM CHLORIDE, AND POTASSIUM CHLORIDE 50; .745; 4.5 G/1000ML; G/1000ML; G/1000ML
INJECTION, SOLUTION INTRAVENOUS CONTINUOUS
Status: DISCONTINUED | OUTPATIENT
Start: 2024-06-11 | End: 2024-06-11

## 2024-06-11 RX ORDER — SUCRALFATE 1 G/10ML
1 SUSPENSION ORAL 4 TIMES DAILY
Status: DISCONTINUED | OUTPATIENT
Start: 2024-06-12 | End: 2024-06-12 | Stop reason: HOSPADM

## 2024-06-11 RX ORDER — FAMOTIDINE 10 MG/ML
20 INJECTION INTRAVENOUS 2 TIMES DAILY
Status: DISCONTINUED | OUTPATIENT
Start: 2024-06-12 | End: 2024-06-12 | Stop reason: HOSPADM

## 2024-06-11 RX ORDER — ALUMINUM HYDROXIDE, MAGNESIUM HYDROXIDE, AND SIMETHICONE 1200; 120; 1200 MG/30ML; MG/30ML; MG/30ML
30 SUSPENSION ORAL ONCE
Status: COMPLETED | OUTPATIENT
Start: 2024-06-11 | End: 2024-06-11

## 2024-06-11 RX ORDER — ACETAMINOPHEN 325 MG/1
650 TABLET ORAL EVERY 4 HOURS PRN
Status: DISCONTINUED | OUTPATIENT
Start: 2024-06-11 | End: 2024-06-12 | Stop reason: HOSPADM

## 2024-06-11 RX ORDER — PANTOPRAZOLE SODIUM 40 MG/10ML
40 INJECTION, POWDER, LYOPHILIZED, FOR SOLUTION INTRAVENOUS DAILY
Status: DISCONTINUED | OUTPATIENT
Start: 2024-06-12 | End: 2024-06-12 | Stop reason: HOSPADM

## 2024-06-11 RX ORDER — LIDOCAINE HYDROCHLORIDE 20 MG/ML
15 SOLUTION OROPHARYNGEAL ONCE
Status: COMPLETED | OUTPATIENT
Start: 2024-06-11 | End: 2024-06-11

## 2024-06-11 RX ORDER — PROCHLORPERAZINE EDISYLATE 5 MG/ML
5 INJECTION INTRAMUSCULAR; INTRAVENOUS EVERY 6 HOURS PRN
Status: DISCONTINUED | OUTPATIENT
Start: 2024-06-11 | End: 2024-06-12 | Stop reason: HOSPADM

## 2024-06-11 RX ORDER — ONDANSETRON HYDROCHLORIDE 2 MG/ML
4 INJECTION, SOLUTION INTRAVENOUS ONCE
Status: COMPLETED | OUTPATIENT
Start: 2024-06-11 | End: 2024-06-11

## 2024-06-11 RX ADMIN — LIDOCAINE HYDROCHLORIDE 15 ML: 20 SOLUTION ORAL; TOPICAL at 05:06

## 2024-06-11 RX ADMIN — DROPERIDOL 0.62 MG: 2.5 INJECTION, SOLUTION INTRAMUSCULAR; INTRAVENOUS at 08:06

## 2024-06-11 RX ADMIN — ONDANSETRON 4 MG: 2 INJECTION INTRAMUSCULAR; INTRAVENOUS at 05:06

## 2024-06-11 RX ADMIN — ALUMINUM HYDROXIDE, MAGNESIUM HYDROXIDE, AND SIMETHICONE 30 ML: 1200; 120; 1200 SUSPENSION ORAL at 05:06

## 2024-06-11 RX ADMIN — POTASSIUM CHLORIDE, DEXTROSE MONOHYDRATE AND SODIUM CHLORIDE: 150; 5; 450 INJECTION, SOLUTION INTRAVENOUS at 11:06

## 2024-06-11 RX ADMIN — KETOROLAC TROMETHAMINE 15 MG: 30 INJECTION, SOLUTION INTRAMUSCULAR; INTRAVENOUS at 07:06

## 2024-06-11 RX ADMIN — PANTOPRAZOLE SODIUM 40 MG: 40 INJECTION, POWDER, FOR SOLUTION INTRAVENOUS at 05:06

## 2024-06-11 RX ADMIN — SODIUM CHLORIDE, POTASSIUM CHLORIDE, SODIUM LACTATE AND CALCIUM CHLORIDE 1000 ML: 600; 310; 30; 20 INJECTION, SOLUTION INTRAVENOUS at 05:06

## 2024-06-11 NOTE — TELEPHONE ENCOUNTER
Pt calling with c/o abdominal pain and just has EGD and he said that they gave him several rx and he is taking them as directed and pain is getting worse. Pt triaged and care advice is to the E D now and pt said ok. He said that he goes to the ED and they just give him meds and send him out. Pt told to call back once home if any other questions concerns, abdominal pain worse or fever. Pt to call as needed I will route message to provider                 Reason for Disposition   SEVERE abdominal pain (e.g., excruciating)    Additional Information   Negative: Passed out (i.e., fainted, collapsed and was not responding)   Negative: Shock suspected (e.g., cold/pale/clammy skin, too weak to stand, low BP, rapid pulse)   Negative: Sounds like a life-threatening emergency to the triager    Protocols used: Abdominal Pain - Male-A-[place]

## 2024-06-11 NOTE — ED TRIAGE NOTES
Patient has been seen multiple times in the ED recently for epigastric pain with N/V. EGD on 6/5 showed hiatal hernia and esophagitis. States no relief with prescribed meds. Seen at [facility] this morning with same complaint - states was told to continue current meds. Presents awake, alert with no distress noted.

## 2024-06-11 NOTE — Clinical Note
Diagnosis: Esophagitis [635345]   Future Attending Provider: SIOMARA MONTOYA [[id number]]   Is the patient being sent to ED Observation?: Yes   Special Needs:: No Special Needs [1]

## 2024-06-11 NOTE — FIRST PROVIDER EVALUATION
"Medical screening examination initiated.  I have conducted a focused provider triage encounter, findings are as follows:    Brief history of present illness:  upper abd pain, dx with ulcerative esophagitis on the 5th after having an EGD.  Complaint with protonix and pepcid.  States eating bland diet.  Reports persisitent pain along with n/v.  Seen at [facility] ER earlier today for same complaint, had labs done which were unremarkable.      Vitals:    06/11/24 1555   BP: 137/77   BP Location: Left arm   Patient Position: Sitting   Pulse: 77   Resp: 20   Temp: 97.9 °F (36.6 °C)   TempSrc: Oral   SpO2: 98%   Weight: 104.3 kg (230 lb)   Height: 5' 10" (1.778 m)       Pertinent physical exam:  well appearing    Brief workup plan:  eval once roomed, cbc    Preliminary workup initiated; this workup will be continued and followed by the physician or advanced practice provider that is assigned to the patient when roomed.  "

## 2024-06-12 VITALS
OXYGEN SATURATION: 98 % | BODY MASS INDEX: 32.93 KG/M2 | WEIGHT: 230 LBS | HEIGHT: 70 IN | TEMPERATURE: 98 F | SYSTOLIC BLOOD PRESSURE: 143 MMHG | RESPIRATION RATE: 16 BRPM | DIASTOLIC BLOOD PRESSURE: 65 MMHG | HEART RATE: 63 BPM

## 2024-06-12 LAB
ANION GAP SERPL CALC-SCNC: 8 MMOL/L (ref 8–16)
BASOPHILS # BLD AUTO: 0.06 K/UL (ref 0–0.2)
BASOPHILS NFR BLD: 0.5 % (ref 0–1.9)
BUN SERPL-MCNC: 16 MG/DL (ref 6–20)
CALCIUM SERPL-MCNC: 9 MG/DL (ref 8.7–10.5)
CHLORIDE SERPL-SCNC: 106 MMOL/L (ref 95–110)
CO2 SERPL-SCNC: 23 MMOL/L (ref 23–29)
CREAT SERPL-MCNC: 1.2 MG/DL (ref 0.5–1.4)
DIFFERENTIAL METHOD BLD: ABNORMAL
EOSINOPHIL # BLD AUTO: 2.5 K/UL (ref 0–0.5)
EOSINOPHIL NFR BLD: 20.5 % (ref 0–8)
ERYTHROCYTE [DISTWIDTH] IN BLOOD BY AUTOMATED COUNT: 12.7 % (ref 11.5–14.5)
EST. GFR  (NO RACE VARIABLE): >60 ML/MIN/1.73 M^2
GLUCOSE SERPL-MCNC: 115 MG/DL (ref 70–110)
HCT VFR BLD AUTO: 46.1 % (ref 40–54)
HGB BLD-MCNC: 15.1 G/DL (ref 14–18)
IMM GRANULOCYTES # BLD AUTO: 0.04 K/UL (ref 0–0.04)
IMM GRANULOCYTES NFR BLD AUTO: 0.3 % (ref 0–0.5)
LIPASE SERPL-CCNC: 37 U/L (ref 4–60)
LYMPHOCYTES # BLD AUTO: 3 K/UL (ref 1–4.8)
LYMPHOCYTES NFR BLD: 24.4 % (ref 18–48)
MCH RBC QN AUTO: 26.6 PG (ref 27–31)
MCHC RBC AUTO-ENTMCNC: 32.8 G/DL (ref 32–36)
MCV RBC AUTO: 81 FL (ref 82–98)
MONOCYTES # BLD AUTO: 0.7 K/UL (ref 0.3–1)
MONOCYTES NFR BLD: 5.3 % (ref 4–15)
NEUTROPHILS # BLD AUTO: 6 K/UL (ref 1.8–7.7)
NEUTROPHILS NFR BLD: 49 % (ref 38–73)
NRBC BLD-RTO: 0 /100 WBC
PLATELET # BLD AUTO: 187 K/UL (ref 150–450)
PMV BLD AUTO: 11.2 FL (ref 9.2–12.9)
POTASSIUM SERPL-SCNC: 3.9 MMOL/L (ref 3.5–5.1)
RBC # BLD AUTO: 5.68 M/UL (ref 4.6–6.2)
SODIUM SERPL-SCNC: 137 MMOL/L (ref 136–145)
WBC # BLD AUTO: 12.19 K/UL (ref 3.9–12.7)

## 2024-06-12 PROCEDURE — 25000003 PHARM REV CODE 250: Performed by: NURSE PRACTITIONER

## 2024-06-12 PROCEDURE — 85025 COMPLETE CBC W/AUTO DIFF WBC: CPT | Performed by: NURSE PRACTITIONER

## 2024-06-12 PROCEDURE — 96375 TX/PRO/DX INJ NEW DRUG ADDON: CPT

## 2024-06-12 PROCEDURE — 83690 ASSAY OF LIPASE: CPT | Performed by: NURSE PRACTITIONER

## 2024-06-12 PROCEDURE — C9113 INJ PANTOPRAZOLE SODIUM, VIA: HCPCS | Performed by: NURSE PRACTITIONER

## 2024-06-12 PROCEDURE — 80048 BASIC METABOLIC PNL TOTAL CA: CPT | Performed by: NURSE PRACTITIONER

## 2024-06-12 PROCEDURE — 63600175 PHARM REV CODE 636 W HCPCS: Performed by: NURSE PRACTITIONER

## 2024-06-12 PROCEDURE — G0378 HOSPITAL OBSERVATION PER HR: HCPCS

## 2024-06-12 PROCEDURE — 96376 TX/PRO/DX INJ SAME DRUG ADON: CPT

## 2024-06-12 PROCEDURE — 36415 COLL VENOUS BLD VENIPUNCTURE: CPT | Performed by: NURSE PRACTITIONER

## 2024-06-12 RX ORDER — ONDANSETRON 4 MG/1
4 TABLET, ORALLY DISINTEGRATING ORAL EVERY 6 HOURS PRN
Qty: 30 TABLET | Refills: 0 | Status: SHIPPED | OUTPATIENT
Start: 2024-06-12

## 2024-06-12 RX ORDER — SIMETHICONE 80 MG
2 TABLET,CHEWABLE ORAL ONCE
Status: COMPLETED | OUTPATIENT
Start: 2024-06-12 | End: 2024-06-12

## 2024-06-12 RX ORDER — PROMETHAZINE HYDROCHLORIDE 25 MG/1
25 TABLET ORAL EVERY 6 HOURS PRN
Qty: 15 TABLET | Refills: 0 | Status: SHIPPED | OUTPATIENT
Start: 2024-06-12

## 2024-06-12 RX ORDER — PANTOPRAZOLE SODIUM 40 MG/1
40 TABLET, DELAYED RELEASE ORAL 2 TIMES DAILY
Qty: 60 TABLET | Refills: 1 | Status: SHIPPED | OUTPATIENT
Start: 2024-06-12 | End: 2024-08-11

## 2024-06-12 RX ADMIN — PANTOPRAZOLE SODIUM 40 MG: 40 INJECTION, POWDER, LYOPHILIZED, FOR SOLUTION INTRAVENOUS at 09:06

## 2024-06-12 RX ADMIN — FAMOTIDINE 20 MG: 10 INJECTION, SOLUTION INTRAVENOUS at 09:06

## 2024-06-12 RX ADMIN — SUCRALFATE 1 G: 1 SUSPENSION ORAL at 09:06

## 2024-06-12 RX ADMIN — SIMETHICONE 160 MG: 80 TABLET, CHEWABLE ORAL at 09:06

## 2024-06-12 NOTE — H&P
ED Observation Unit  History and Physical      I assumed care of this patient from the Main ED at onset of observation time, 1953 on 06/11/2024.       History of Present Illness:    Clinton Brooks is a 29 y.o. male presenting to the emergency department reporting persistent upper abd pain x 2 weeks.  He was initially seen here on the 5th and had an EGD which showed ulcerative esophagitis.  Was Dc'd with protonix, pepcid and bland diet instructions.  Since that visit he has been back to the ED 3 times including to [facility] earlier today with abdominal pain and nausea.  No fever.  Had labs done at [facility] earlier today.       Of note, patient ingestion bleach accidentally on may 5th of this year.         I reviewed the ED Provider Note dated 6/11/2024 prior to my evaluation of this patient.  I reviewed all labs and imaging performed in the Main ED, prior to patient being placed in Observation. Patient was placed in the ED Observation Unit for Epigastric abdominal pain.    PMHx   History reviewed. No pertinent past medical history.   Past Surgical History:   Procedure Laterality Date    ESOPHAGOGASTRODUODENOSCOPY N/A 11/19/2019    Procedure: EGD (ESOPHAGOGASTRODUODENOSCOPY);  Surgeon: Kemar Pittman III, MD;  Location: [facility];  Service: Endoscopy;  Laterality: N/A;    ESOPHAGOGASTRODUODENOSCOPY N/A 6/5/2024    Procedure: EGD (ESOPHAGOGASTRODUODENOSCOPY);  Surgeon: Dominick Jackson MD;  Location: [facility];  Service: Endoscopy;  Laterality: N/A;        Family Hx   Family History   Problem Relation Name Age of Onset    Arthritis Mother      Asthma Mother      Cancer Mother      Diabetes Mother      Hypertension Mother      Arthritis Father      Diabetes Father      Hypertension Father      Asthma Maternal Aunt      Cancer Maternal Aunt      Diabetes Maternal Aunt      Hypertension Maternal Aunt      Asthma Maternal Uncle      Diabetes Maternal Uncle      Hypertension Maternal Uncle      Hypertension Paternal Aunt       "Hypertension Paternal Uncle      Arthritis Maternal Grandmother      Cancer Maternal Grandmother      Heart disease Maternal Grandmother      Hypertension Maternal Grandmother      Arthritis Maternal Grandfather      Heart disease Maternal Grandfather      Hypertension Maternal Grandfather      Arthritis Paternal Grandmother      Hypertension Paternal Grandmother      Arthritis Paternal Grandfather      Hypertension Paternal Grandfather          Social Hx   Social History     Socioeconomic History    Marital status:    Tobacco Use    Smoking status: Never    Smokeless tobacco: Never   Substance and Sexual Activity    Alcohol use: No     Comment: social    Drug use: No    Sexual activity: Yes        Vital Signs   Vitals:    06/11/24 1555 06/11/24 1912 06/11/24 2032   BP: 137/77 (!) 144/82    BP Location: Left arm Left arm    Patient Position: Sitting Lying    Pulse: 77 72    Resp: 20  18   Temp: 97.9 °F (36.6 °C)  98.4 °F (36.9 °C)   TempSrc: Oral  Oral   SpO2: 98% 98%    Weight: 104.3 kg (230 lb)     Height: 5' 10" (1.778 m)          Review of Systems  Review of Systems   Constitutional: Negative.    HENT: Negative.     Eyes: Negative.    Respiratory: Negative.     Cardiovascular: Negative.    Gastrointestinal:  Positive for abdominal pain, nausea and vomiting.   Genitourinary: Negative.    Musculoskeletal: Negative.    Skin: Negative.    Neurological: Negative.    Endo/Heme/Allergies: Negative.    Psychiatric/Behavioral: Negative.     All other systems reviewed and are negative.      Physical Exam  Physical Exam  Vitals and nursing note reviewed.   Constitutional:       Appearance: Normal appearance.   HENT:      Head: Normocephalic and atraumatic.      Nose: Nose normal.      Mouth/Throat:      Mouth: Mucous membranes are moist.   Eyes:      Extraocular Movements: Extraocular movements intact.      Conjunctiva/sclera: Conjunctivae normal.      Pupils: Pupils are equal, round, and reactive to light. "   Cardiovascular:      Rate and Rhythm: Normal rate and regular rhythm.      Pulses: Normal pulses.      Heart sounds: Normal heart sounds. No murmur heard.     No gallop.   Pulmonary:      Effort: Pulmonary effort is normal.      Breath sounds: Normal breath sounds.   Abdominal:      General: Bowel sounds are normal.      Palpations: Abdomen is soft.      Tenderness: There is abdominal tenderness.   Musculoskeletal:         General: No swelling or deformity. Normal range of motion.      Cervical back: Normal range of motion.   Skin:     General: Skin is warm.      Capillary Refill: Capillary refill takes less than 2 seconds.   Neurological:      General: No focal deficit present.      Mental Status: He is alert.   Psychiatric:         Mood and Affect: Mood normal.       Medications:   Scheduled Meds:   [START ON 6/12/2024] famotidine (PF)  20 mg Intravenous BID    [START ON 6/12/2024] pantoprazole  40 mg Intravenous Daily    [START ON 6/12/2024] sucralfate  1 g Oral QID     Continuous Infusions:  PRN Meds:.  Current Facility-Administered Medications:     acetaminophen, 650 mg, Oral, Q4H PRN    ondansetron, 4 mg, Intravenous, Q6H PRN    prochlorperazine, 5 mg, Intravenous, Q6H PRN    sodium chloride 0.9%, 10 mL, Intravenous, PRN      Assessment/Plan:  30 y/o male with gastritis and N/V. Ingested bleach on 5/5 and now has ulcerative esophagitis. Seen multiple times at different ED's and isn't getting better. Admitted for IV fluids, intractable nausea/vomiting, and GI consult.     Case was discussed with the ED provider, Cecelia Brizuela NP .

## 2024-06-12 NOTE — ED NOTES
"Lying on stretcher; AAOx4. Skin:warm and dry. Resp:18 even and unlabored. States pain 5/10 "tolerable" and c/o mild nausea. Pt states he does not feel he needs any other medication at this time. States he will call if need for medication. NADN. Bed locked in low position and call light within reach.   "

## 2024-06-12 NOTE — PROGRESS NOTES
ED Observation Unit  Progress Note      HPI   Clinton Brooks is a 29 y.o. male presenting to the emergency department reporting persistent upper abd pain x 2 weeks.  He was initially seen here on the 5th and had an EGD which showed ulcerative esophagitis.  Was Dc'd with protonix, pepcid and bland diet instructions.  Since that visit he has been back to the ED 3 times including to [facility] earlier today with abdominal pain and nausea.  No fever.  Had labs done at [facility] earlier today.       Of note, patient ingestion bleach accidentally on may 5th of this year.      ED course:  Right upper quadrant ultrasound today was negative. Labs reveal no leukocytosis, stable H&H, has a mild eosinophilia. He was to be discharged home with clear diet instructions however he remained nauseous with active vomiting and 7/10 abdominal pain. Plan for admission to the EDOU for pain control and GI consult in the morning     Interval History   No acute events overnight.  Patient has tolerated a clear liquid diet without emesis, however he does report gas pain.  Will treat with simethicone.  Pending GI evaluation.    PMHx   History reviewed. No pertinent past medical history.   Past Surgical History:   Procedure Laterality Date    ESOPHAGOGASTRODUODENOSCOPY N/A 11/19/2019    Procedure: EGD (ESOPHAGOGASTRODUODENOSCOPY);  Surgeon: Kemar Pittman III, MD;  Location: [facility];  Service: Endoscopy;  Laterality: N/A;    ESOPHAGOGASTRODUODENOSCOPY N/A 6/5/2024    Procedure: EGD (ESOPHAGOGASTRODUODENOSCOPY);  Surgeon: Dominick Jackson MD;  Location: [facility];  Service: Endoscopy;  Laterality: N/A;        Family Hx   Family History   Problem Relation Name Age of Onset    Arthritis Mother      Asthma Mother      Cancer Mother      Diabetes Mother      Hypertension Mother      Arthritis Father      Diabetes Father      Hypertension Father      Asthma Maternal Aunt      Cancer Maternal Aunt      Diabetes Maternal Aunt      Hypertension Maternal Aunt       Asthma Maternal Uncle      Diabetes Maternal Uncle      Hypertension Maternal Uncle      Hypertension Paternal Aunt      Hypertension Paternal Uncle      Arthritis Maternal Grandmother      Cancer Maternal Grandmother      Heart disease Maternal Grandmother      Hypertension Maternal Grandmother      Arthritis Maternal Grandfather      Heart disease Maternal Grandfather      Hypertension Maternal Grandfather      Arthritis Paternal Grandmother      Hypertension Paternal Grandmother      Arthritis Paternal Grandfather      Hypertension Paternal Grandfather          Social Hx   Social History     Socioeconomic History    Marital status:    Tobacco Use    Smoking status: Never    Smokeless tobacco: Never   Substance and Sexual Activity    Alcohol use: No     Comment: social    Drug use: No    Sexual activity: Yes        Vital Signs   Vitals:    06/11/24 2223 06/12/24 0002 06/12/24 0433 06/12/24 0807   BP: 132/60 (!) 150/72 127/60 (!) 143/65   BP Location: Right arm Left arm Right arm Left arm   Patient Position: Lying Lying Lying Lying   Pulse: 86 74 60 63   Resp: 18 18 18 16   Temp: 98 °F (36.7 °C) 98.3 °F (36.8 °C) 98.6 °F (37 °C) 98.2 °F (36.8 °C)   TempSrc: Oral Oral Oral Oral   SpO2: 99% 97% 98% 98%   Weight:       Height:            Review of Systems  Review of Systems   Constitutional:  Negative for chills and fever.   Eyes:  Negative for blurred vision and double vision.   Respiratory:  Negative for cough and shortness of breath.    Cardiovascular:  Negative for chest pain.   Gastrointestinal:  Positive for abdominal pain. Negative for nausea and vomiting.   Genitourinary:  Negative for dysuria and flank pain.   Musculoskeletal:  Negative for back pain and myalgias.   Skin:  Negative for rash.   Neurological:  Negative for dizziness, weakness and headaches.   Endo/Heme/Allergies:  Does not bruise/bleed easily.   Psychiatric/Behavioral:  Negative for depression.        Brief Physical Exam/Reassessment    Physical Exam  Vitals reviewed.   Constitutional:       General: He is not in acute distress.     Appearance: He is not toxic-appearing.   HENT:      Head: Normocephalic and atraumatic.   Eyes:      General: No scleral icterus.     Conjunctiva/sclera: Conjunctivae normal.   Cardiovascular:      Rate and Rhythm: Normal rate and regular rhythm.      Heart sounds: Normal heart sounds.   Pulmonary:      Effort: Pulmonary effort is normal. No respiratory distress.      Breath sounds: No wheezing.   Abdominal:      General: There is no distension.      Tenderness: There is no abdominal tenderness. There is no guarding or rebound.   Musculoskeletal:         General: Normal range of motion.      Cervical back: No rigidity or tenderness.   Skin:     General: Skin is warm and dry.      Capillary Refill: Capillary refill takes less than 2 seconds.      Coloration: Skin is not jaundiced.      Findings: No erythema.   Neurological:      General: No focal deficit present.      Mental Status: He is alert and oriented to person, place, and time.      Motor: No weakness.   Psychiatric:         Behavior: Behavior normal.         Labs/Imaging   Labs Reviewed   CBC W/ AUTO DIFFERENTIAL - Abnormal; Notable for the following components:       Result Value    MCV 80 (*)     MCH 26.4 (*)     Eos # 2.6 (*)     Eosinophil % 21.0 (*)     All other components within normal limits   CBC W/ AUTO DIFFERENTIAL - Abnormal; Notable for the following components:    MCV 81 (*)     MCH 26.6 (*)     Eos # 2.5 (*)     Eosinophil % 20.5 (*)     All other components within normal limits    Narrative:     12 hours after arrival to the ED   BASIC METABOLIC PANEL - Abnormal; Notable for the following components:    Glucose 115 (*)     All other components within normal limits    Narrative:     12 hours after arrival to the ED   LIPASE    Narrative:     12 hours after arrival to the ED      Imaging Results              US Abdomen Limited (Final result)   Result time 06/11/24 18:16:04      Final result by Annika Ballesteros MD (06/11/24 18:16:04)                   Impression:      No significant abnormalities identified.      Electronically signed by: Annika Ballesteros MD  Date:    06/11/2024  Time:    18:16               Narrative:    EXAMINATION:  US ABDOMEN LIMITED    CLINICAL HISTORY:  RUQ pain;    TECHNIQUE:  Limited ultrasound of the right upper quadrant of the abdomen was performed.    COMPARISON:  CT abdomen and pelvis from 06/04/2024.    FINDINGS:  Visualized hepatic parenchyma is homogeneous without evidence for masses.  No intra- or extrahepatic biliary ductal dilatation. The common bile duct measures 0.3 cm.  The gallbladder appears normal. No evidence for cholelithiasis.  Sonographic Muñoz's sign is negative. The visualized portion of the pancreas appears normal.  No ascites.                                       I reviewed all labs, imaging, EKGs.     Plan   Intractable abdominal pain and nausea  -PRN analgesia  -PRN antiemetics  -GI consult

## 2024-06-12 NOTE — ED NOTES
DRY HEAVING:  Sitting up along side of the bed with emesis bag up to mouth. Dry heaving noted. Cecelia Brizuela NP notified. Awaiting new orders. Pt updated provider notified and awaiting orders. Voiced understanding.

## 2024-06-12 NOTE — ED NOTES
Patient report RUQ abdominal pain 7/10, denies nausea/vomiting, MD notified, no new orders at this time.

## 2024-06-12 NOTE — DISCHARGE SUMMARY
[facility] ED Observation Unit  Discharge Summary        History of Present Illness:    Clinton Brooks is a 29 y.o. male presenting to the emergency department reporting persistent upper abd pain x 2 weeks.  He was initially seen here on the 5th and had an EGD which showed ulcerative esophagitis.  Was Dc'd with protonix, pepcid and bland diet instructions.  Since that visit he has been back to the ED 3 times including to [facility] earlier today with abdominal pain and nausea.  No fever.  Had labs done at [facility] earlier today.       Of note, patient ingestion bleach accidentally on may 5th of this year.       ED course:  Right upper quadrant ultrasound today was negative. Labs reveal no leukocytosis, stable H&H, has a mild eosinophilia. He was to be discharged home with clear diet instructions however he remained nauseous with active vomiting and 7/10 abdominal pain. Plan for admission to the EDOU for pain control and GI consult in the morning      Observation Course:    Overall patient says he is feeling better today. Patient was evaluated by GI and cleared for discharge home.  Recommends continuing clear liquid diet/bland fluids, Protonix b.i.d. Pepcid p.r.n., and antiemetics as needed.  Patient will also need to follow-up on an outpatient basis, referral given and patient discharged home in good condition with outpatient follow-up. Patient educated on signs and symptoms to monitor for and when to return to ED. Patient verbalized understanding agrees with treatment plan. All questions and concerns addressed.       Consultants:    Gi- Dr. Hudson    ED/OBS Workup:  Vitals:    06/11/24 2032 06/11/24 2223 06/12/24 0002 06/12/24 0433   BP:  132/60 (!) 150/72 127/60   Pulse:  86 74 60   Resp: 18 18 18 18   Temp: 98.4 °F (36.9 °C) 98 °F (36.7 °C) 98.3 °F (36.8 °C) 98.6 °F (37 °C)   TempSrc: Oral Oral Oral Oral   SpO2:  99% 97% 98%   Weight:       Height:        06/12/24 0807   BP: (!) 143/65   Pulse: 63   Resp: 16   Temp: 98.2 °F  (36.8 °C)   TempSrc: Oral   SpO2: 98%   Weight:    Height:        Labs Reviewed   CBC W/ AUTO DIFFERENTIAL - Abnormal; Notable for the following components:       Result Value    MCV 80 (*)     MCH 26.4 (*)     Eos # 2.6 (*)     Eosinophil % 21.0 (*)     All other components within normal limits   CBC W/ AUTO DIFFERENTIAL - Abnormal; Notable for the following components:    MCV 81 (*)     MCH 26.6 (*)     Eos # 2.5 (*)     Eosinophil % 20.5 (*)     All other components within normal limits    Narrative:     12 hours after arrival to the ED   BASIC METABOLIC PANEL - Abnormal; Notable for the following components:    Glucose 115 (*)     All other components within normal limits    Narrative:     12 hours after arrival to the ED   LIPASE    Narrative:     12 hours after arrival to the ED       US Abdomen Limited   Final Result      No significant abnormalities identified.         Electronically signed by: Annika Ballesteros MD   Date:    06/11/2024   Time:    18:16          Final Diagnosis:  1. Esophagitis        Plan:  PPI BID  Carafate  Antiemetics PRN  Outpatient GI f/u    Discharge Condition: Good    Disposition: Home or Self Care     Time spent on the discharge of the patient including review of hospital course with the patient. reviewing discharge medications and arranging follow-up care 35 minutes.  Patient was seen and examined on the date of discharge and determined to be suitable for discharge.    Follow Up:   ED Disposition Condition    Discharge Good            ED Prescriptions       Medication Sig Dispense Start Date End Date Auth. Provider    ondansetron (ZOFRAN-ODT) 4 MG TbDL Take 1 tablet (4 mg total) by mouth every 6 (six) hours as needed (nausea). 30 tablet 6/12/2024 -- Florian Prasad NP    promethazine (PHENERGAN) 25 MG tablet Take 1 tablet (25 mg total) by mouth every 6 (six) hours as needed for Nausea. 15 tablet 6/12/2024 -- Florian Prasad NP    pantoprazole (PROTONIX) 40 MG tablet Take 1  tablet (40 mg total) by mouth 2 (two) times daily. 60 tablet 6/12/2024 8/11/2024 Florian Prasad, NP          Follow-up Information       Follow up With Specialties Details Why Contact Info    Rel[facility] - Emergency Dept Emergency Medicine Go to  If symptoms worsen [address] [facility] [phone]    Gastroenterology, [facility] - Gastroenterology Schedule an appointment as soon as possible for a visit   [address] [facility] [address]  [phone]      [facility] Schedule an appointment as soon as possible for a visit   [address]  [address]  [facility] [address]  [phone]              No future appointments.

## 2024-06-12 NOTE — DISCHARGE INSTRUCTIONS
Continue follow up with GI. Follow up at [facility] or [facility]. Info above    Clear liquid diet until follow up.  [place] [employer].      Use Carafate before meals and before bed. Take the Protonix twice a day and the Pecid nightly as needed.

## 2024-06-12 NOTE — ED PROVIDER NOTES
Source of History:  Patient     Chief complaint:  Emesis (Reports coming to ED for same complaint x over the past week. Reports NVD x 2 weeks. Unable to keep anything down. Has been following the BRAT diet without improvement in symptoms. Reports being dx with an esophageal hernia previously. Reports RUQ abd pain. Appt with GI in July. )      HPI:  Clinton Brooks is a 29 y.o. male presenting to the emergency department reporting persistent upper abd pain x 2 weeks.  He was initially seen here on the 5th and had an EGD which showed ulcerative esophagitis.  Was Dc'd with protonix, pepcid and bland diet instructions.  Since that visit he has been back to the ED 3 times including to [facility] earlier today with abdominal pain and nausea.  No fever.  Had labs done at [facility] earlier today.      Of note, patient ingestion bleach accidentally on may 5th of this year.      This is the extent to the patients complaints today here in the emergency department.    PMH:  As per HPI and below:  History reviewed. No pertinent past medical history.  Past Surgical History:   Procedure Laterality Date    ESOPHAGOGASTRODUODENOSCOPY N/A 11/19/2019    Procedure: EGD (ESOPHAGOGASTRODUODENOSCOPY);  Surgeon: Kemar Pittman III, MD;  [facility];  Service: Endoscopy;  Laterality: N/A;    ESOPHAGOGASTRODUODENOSCOPY N/A 6/5/2024    Procedure: EGD (ESOPHAGOGASTRODUODENOSCOPY);  Surgeon: Dominick Jackson MD;  Location: [facility];  Service: Endoscopy;  Laterality: N/A;       Social History     Tobacco Use    Smoking status: Never    Smokeless tobacco: Never   Substance Use Topics    Alcohol use: No     Comment: social    Drug use: No       Review of patient's allergies indicates:  No Known Allergies    ROS: As per HPI and below:  General: No  fever.  No chills.    ENT: No sore throat. No ear pain  Chest: No shortness of breath. No cough.    Cardiovascular: No chest pain.   Abdomen: abd pain, n/v  Genito-Urinary: No abnormal urination.   "  Neurologic: No focal weakness.  No numbness.  No headache  MSK: no back pain.   Integument: No rashes or lesions.    Physical Exam:    BP (!) 144/82 (BP Location: Left arm, Patient Position: Lying)   Pulse 72   Temp 97.9 °F (36.6 °C) (Oral)   Resp 20   Ht 5' 10" (1.778 m)   Wt 104.3 kg (230 lb)   SpO2 98%   BMI 33.00 kg/m²   Vitals:    06/11/24 1555 06/11/24 1912   BP: 137/77 (!) 144/82   Pulse: 77 72   Resp: 20    Temp: 97.9 °F (36.6 °C)    TempSrc: Oral    SpO2: 98% 98%   Weight: 104.3 kg (230 lb)    Height: 5' 10" (1.778 m)        Nursing note and vital signs reviewed.  Appearance: No acute distress. Well-appearing. Non-toxic.    Eyes: No conjunctival injection.  Extraocular muscles are intact.  ENT: MM are pink and moist.  Erythema noted to posterior pharynx.  No trismus, no uvula swelling or deviation  Chest/ Respiratory: Clear to auscultation bilaterally.  Good air movement.  No wheezes.  No rhonchi. No rales. No accessory muscle use.  Cardiovascular: Regular rate and rhythm.  No murmurs. No gallops. No rubs.  Abdomen: Soft.  RUQ and epigastric TTP, no guarding, no distention.    Back: no CVA tenderness  Musculoskeletal: Good range of motion all joints.  No deformities.  Neck supple.  No meningismus.  Skin: No rashes seen.  Good turgor.  No abrasions.  No ecchymoses.  Neuro: alert and oriented x3,  no focal neurological deficits.  Psych: Appropriate, conversant      Labs Reviewed   CBC W/ AUTO DIFFERENTIAL - Abnormal; Notable for the following components:       Result Value    MCV 80 (*)     MCH 26.4 (*)     Eos # 2.6 (*)     Eosinophil % 21.0 (*)     All other components within normal limits       US Abdomen Limited   Final Result      No significant abnormalities identified.         Electronically signed by: Annika Ballesteros MD   Date:    06/11/2024   Time:    18:16            Initial Impression/ Differential Dx:  Differential diagnosis includes but not limited to: GERD, intestinal spasm, " gastroenteritis, gastritis, ulcer, cholecystitis, cholelithiasis, gallstones, pancreatitis, ileus, small bowel obstruction, appendicitis, diverticulitis, colitis, constipation, intestinal gas pain      MDM:    Medical Decision Making  29 year old male with persistent RUQ abd pain and nausea x 2 weeks.  Diagnosed with ulcerative esophagitis on the 5th while he was admitted to the EDOU at this facility.  He was discharged with pepcid and protonix.  Since DC he reports no improvement in pain.  He was seen on the 8th for same complaint and given carafate and lidocaine with no improvement at home.  He went to [facility] ED this AM and had labs done which were unremarkable,   He Was discharged Home with instruction to follow up with GI.  Presented to our emergency department with persistent pain, on exam there was some right upper quadrant tenderness.  Right upper quadrant ultrasound today was negative.  Labs reveal no leukocytosis, stable H&H, has a mild eosinophilia.  He was to be discharged home with clear diet instructions however he remained nauseous with active vomiting and 7/10 abdominal pain.  Plan for admission to the EDOU for  pain control and GI consult in the morning    Amount and/or Complexity of Data Reviewed  Labs: ordered. Decision-making details documented in ED Course.  Radiology: ordered.    Risk  OTC drugs.  Prescription drug management.        ED Course as of 06/11/24 2006 Tue Jun 11, 2024   1749 WBC: 12.16 [AS]   1749 Hemoglobin: 15.9 [AS]   1749 Hematocrit: 48.0 [AS]   1749 Platelet Count: 216 [AS]      ED Course User Index  [AS] Cecelia Brizuela FNP       Diagnostic Impression:    1. Esophagitis         ED Disposition Condition    Observation Stable                      Cecelia Brizuela FNP  06/11/24 2006

## 2025-05-22 ENCOUNTER — HOSPITAL ENCOUNTER (EMERGENCY)
Facility: OTHER | Age: 30
Discharge: HOME | End: 2025-05-22
Attending: EMERGENCY MEDICINE

## 2025-05-22 VITALS
HEART RATE: 78 BPM | RESPIRATION RATE: 17 BRPM | DIASTOLIC BLOOD PRESSURE: 78 MMHG | TEMPERATURE: 98 F | BODY MASS INDEX: 34.36 KG/M2 | OXYGEN SATURATION: 99 % | WEIGHT: 240 LBS | SYSTOLIC BLOOD PRESSURE: 139 MMHG | HEIGHT: 70 IN

## 2025-05-22 DIAGNOSIS — K29.70 GASTRITIS, PRESENCE OF BLEEDING UNSPECIFIED, UNSPECIFIED CHRONICITY, UNSPECIFIED GASTRITIS TYPE: Primary | ICD-10-CM

## 2025-05-22 DIAGNOSIS — R10.13 EPIGASTRIC ABDOMINAL PAIN: ICD-10-CM

## 2025-05-22 LAB
ABSOLUTE EOSINOPHIL (OHS): 0.13 K/UL
ABSOLUTE MONOCYTE (OHS): 0.56 K/UL (ref 0.3–1)
ABSOLUTE NEUTROPHIL COUNT (OHS): 6.47 K/UL (ref 1.8–7.7)
ALBUMIN SERPL BCP-MCNC: 4.4 G/DL (ref 3.5–5.2)
ALP SERPL-CCNC: 64 UNIT/L (ref 40–150)
ALT SERPL W/O P-5'-P-CCNC: 31 UNIT/L (ref 10–44)
ANION GAP (OHS): 9 MMOL/L (ref 8–16)
AST SERPL-CCNC: 26 UNIT/L (ref 11–45)
BACTERIA #/AREA URNS AUTO: NORMAL /HPF
BASOPHILS # BLD AUTO: 0.03 K/UL
BASOPHILS NFR BLD AUTO: 0.3 %
BILIRUB SERPL-MCNC: 0.4 MG/DL (ref 0.1–1)
BILIRUB UR QL STRIP.AUTO: NEGATIVE
BUN SERPL-MCNC: 16 MG/DL (ref 6–20)
CALCIUM SERPL-MCNC: 9.4 MG/DL (ref 8.7–10.5)
CHLORIDE SERPL-SCNC: 105 MMOL/L (ref 95–110)
CLARITY UR: CLEAR
CO2 SERPL-SCNC: 23 MMOL/L (ref 23–29)
COLOR UR AUTO: YELLOW
CREAT SERPL-MCNC: 1 MG/DL (ref 0.5–1.4)
CREAT SERPL-MCNC: 1 MG/DL (ref 0.5–1.4)
ERYTHROCYTE [DISTWIDTH] IN BLOOD BY AUTOMATED COUNT: 13.1 % (ref 11.5–14.5)
GFR SERPLBLD CREATININE-BSD FMLA CKD-EPI: >60 ML/MIN/1.73/M2
GLUCOSE SERPL-MCNC: 117 MG/DL (ref 70–110)
GLUCOSE UR QL STRIP: NEGATIVE
HCT VFR BLD AUTO: 48.6 % (ref 40–54)
HGB BLD-MCNC: 16.2 GM/DL (ref 14–18)
HGB UR QL STRIP: NEGATIVE
HOLD SPECIMEN: NORMAL
HYALINE CASTS UR QL AUTO: 0 /LPF (ref 0–1)
IMM GRANULOCYTES # BLD AUTO: 0.02 K/UL (ref 0–0.04)
IMM GRANULOCYTES NFR BLD AUTO: 0.2 % (ref 0–0.5)
KETONES UR QL STRIP: NEGATIVE
LEUKOCYTE ESTERASE UR QL STRIP: NEGATIVE
LIPASE SERPL-CCNC: 18 U/L (ref 4–60)
LYMPHOCYTES # BLD AUTO: 1.71 K/UL (ref 1–4.8)
MCH RBC QN AUTO: 26.8 PG (ref 27–31)
MCHC RBC AUTO-ENTMCNC: 33.3 G/DL (ref 32–36)
MCV RBC AUTO: 81 FL (ref 82–98)
MICROSCOPIC COMMENT: NORMAL
NITRITE UR QL STRIP: NEGATIVE
NUCLEATED RBC (/100WBC) (OHS): 0 /100 WBC
PH UR STRIP: 8 [PH]
PLATELET # BLD AUTO: 232 K/UL (ref 150–450)
PMV BLD AUTO: 11.2 FL (ref 9.2–12.9)
POTASSIUM SERPL-SCNC: 3.9 MMOL/L (ref 3.5–5.1)
PROT SERPL-MCNC: 8.2 GM/DL (ref 6–8.4)
PROT UR QL STRIP: ABNORMAL
RBC # BLD AUTO: 6.04 M/UL (ref 4.6–6.2)
RBC #/AREA URNS AUTO: 2 /HPF (ref 0–4)
RELATIVE EOSINOPHIL (OHS): 1.5 %
RELATIVE LYMPHOCYTE (OHS): 19.2 % (ref 18–48)
RELATIVE MONOCYTE (OHS): 6.3 % (ref 4–15)
RELATIVE NEUTROPHIL (OHS): 72.5 % (ref 38–73)
SAMPLE: NORMAL
SODIUM SERPL-SCNC: 137 MMOL/L (ref 136–145)
SP GR UR STRIP: >=1.03
SQUAMOUS #/AREA URNS AUTO: 1 /HPF
UROBILINOGEN UR STRIP-ACNC: NEGATIVE EU/DL
WBC # BLD AUTO: 8.92 K/UL (ref 3.9–12.7)
WBC #/AREA URNS AUTO: 1 /HPF (ref 0–5)

## 2025-05-22 PROCEDURE — 83690 ASSAY OF LIPASE: CPT | Performed by: NURSE PRACTITIONER

## 2025-05-22 PROCEDURE — 63600175 PHARM REV CODE 636 W HCPCS: Performed by: PHYSICIAN ASSISTANT

## 2025-05-22 PROCEDURE — 85025 COMPLETE CBC W/AUTO DIFF WBC: CPT | Performed by: NURSE PRACTITIONER

## 2025-05-22 PROCEDURE — 25000003 PHARM REV CODE 250: Performed by: PHYSICIAN ASSISTANT

## 2025-05-22 PROCEDURE — 96361 HYDRATE IV INFUSION ADD-ON: CPT

## 2025-05-22 PROCEDURE — 82040 ASSAY OF SERUM ALBUMIN: CPT | Performed by: NURSE PRACTITIONER

## 2025-05-22 PROCEDURE — 96374 THER/PROPH/DIAG INJ IV PUSH: CPT

## 2025-05-22 PROCEDURE — 99284 EMERGENCY DEPT VISIT MOD MDM: CPT | Mod: 25

## 2025-05-22 PROCEDURE — 81001 URINALYSIS AUTO W/SCOPE: CPT | Performed by: NURSE PRACTITIONER

## 2025-05-22 PROCEDURE — 96375 TX/PRO/DX INJ NEW DRUG ADDON: CPT

## 2025-05-22 RX ORDER — ALUMINUM HYDROXIDE, MAGNESIUM HYDROXIDE, AND SIMETHICONE 1200; 120; 1200 MG/30ML; MG/30ML; MG/30ML
30 SUSPENSION ORAL ONCE
Status: COMPLETED | OUTPATIENT
Start: 2025-05-22 | End: 2025-05-22

## 2025-05-22 RX ORDER — SUCRALFATE 1 G/10ML
1 SUSPENSION ORAL 4 TIMES DAILY
Qty: 414 ML | Refills: 0 | Status: SHIPPED | OUTPATIENT
Start: 2025-05-22

## 2025-05-22 RX ORDER — ONDANSETRON HYDROCHLORIDE 2 MG/ML
4 INJECTION, SOLUTION INTRAVENOUS
Status: COMPLETED | OUTPATIENT
Start: 2025-05-22 | End: 2025-05-22

## 2025-05-22 RX ORDER — PANTOPRAZOLE SODIUM 40 MG/10ML
40 INJECTION, POWDER, LYOPHILIZED, FOR SOLUTION INTRAVENOUS
Status: COMPLETED | OUTPATIENT
Start: 2025-05-22 | End: 2025-05-22

## 2025-05-22 RX ORDER — PANTOPRAZOLE SODIUM 20 MG/1
20 TABLET, DELAYED RELEASE ORAL DAILY
Qty: 30 TABLET | Refills: 0 | Status: SHIPPED | OUTPATIENT
Start: 2025-05-22 | End: 2026-05-22

## 2025-05-22 RX ORDER — LIDOCAINE HYDROCHLORIDE 20 MG/ML
15 SOLUTION OROPHARYNGEAL ONCE
Status: COMPLETED | OUTPATIENT
Start: 2025-05-22 | End: 2025-05-22

## 2025-05-22 RX ADMIN — ONDANSETRON 4 MG: 2 INJECTION INTRAMUSCULAR; INTRAVENOUS at 03:05

## 2025-05-22 RX ADMIN — SODIUM CHLORIDE 1000 ML: 0.9 INJECTION, SOLUTION INTRAVENOUS at 02:05

## 2025-05-22 RX ADMIN — ALUMINUM HYDROXIDE, MAGNESIUM HYDROXIDE, AND DIMETHICONE 30 ML: 200; 20; 200 SUSPENSION ORAL at 03:05

## 2025-05-22 RX ADMIN — LIDOCAINE HYDROCHLORIDE 15 ML: 20 SOLUTION ORAL at 03:05

## 2025-05-22 RX ADMIN — PANTOPRAZOLE SODIUM 40 MG: 40 INJECTION, POWDER, FOR SOLUTION INTRAVENOUS at 02:05

## 2025-05-22 NOTE — ED TRIAGE NOTES
Pt presents to ED today c/o abd pain onset last night denies taking medication for sx     
no fever/no chills

## 2025-05-22 NOTE — Clinical Note
"Clinton Cote" Cecilia was seen and treated in our emergency department on 5/22/2025.  He may return to work on 05/26/2025.       If you have any questions or concerns, please don't hesitate to call.      Delia Navarrete PA"

## 2025-05-22 NOTE — ED PROVIDER NOTES
"     Source of History:  Patient and medical chart    Chief complaint:  Abdominal Pain (Epigastric abd pain onset last pm with nausea. Denies vomiting/diarrhea.)      HPI:  Clinton Brooks is a 30 y.o. male presenting with epigastric abdominal pain.  Patient reports that is symptoms began last night.  He describes it as a burning sensation with the associated nausea.  He denies any fever, chills, vomiting or diarrhea.  Does report symptoms feel similar to previous exacerbations of gastritis.  Had history of ulcerated esophagitis and gastritis.  States he has not been on GI medicines for some time as symptoms had resolved.  Does report that he began a keto diet 2 days ago.    This is the extent to the patients complaints today here in the emergency department.    ROS: As per HPI     Review of patient's allergies indicates:  No Known Allergies    PMH:  As per HPI and below:  History reviewed. No pertinent past medical history.  Past Surgical History:   Procedure Laterality Date    ESOPHAGOGASTRODUODENOSCOPY N/A 11/19/2019    Procedure: EGD (ESOPHAGOGASTRODUODENOSCOPY);  Surgeon: Kemar Pittman III, MD;  Location: [facility];  Service: Endoscopy;  Laterality: N/A;    ESOPHAGOGASTRODUODENOSCOPY N/A 6/5/2024    Procedure: EGD (ESOPHAGOGASTRODUODENOSCOPY);  Surgeon: Dominick Jackson MD;  Location: [facility];  Service: Endoscopy;  Laterality: N/A;       Social History[1]    Physical Exam:    /78 (BP Location: Right arm, Patient Position: Sitting)   Pulse 78   Temp 98.4 °F (36.9 °C)   Resp 17   Ht 5' 10" (1.778 m)   Wt 108.9 kg (240 lb)   SpO2 99%   BMI 34.44 kg/m²   Nursing note and vital signs reviewed.  Constitutional:  Appears uncomfortable secondary to pain, Nontoxic  Eyes: No conjunctival injection.Extraocular muscles are intact.  ENT: Oropharynx clear.  Normal phonation.   Cardiovascular: Regular rate and rhythm.  No murmurs. No gallops. No rubs  Respiratory: Clear to auscultation bilaterally.  Good " air movement.  No wheezes.  No rhonchi. No rales. No accessory muscle use.  Abdomen: Soft.  TTP of epigastric region, Not distended. No guarding.  No rebound. Non-peritoneal.  Musculoskeletal: Good range of motion all joints.  No deformities.  Neck supple.  No meningismus.  Skin: No rashes seen.  Good turgor.  No abrasions.  No ecchymoses.  Neurologic: Motor intact.  Sensation intact.  No ataxia. No focal neurological deficits.  Psych: Appropriate, conversant    Labs that have been ordered have been independently reviewed and interpreted by myself.    I decided to obtain the patient's medical records.      MDM/ Differential Dx:    Clinton Brooks 30 y.o. presented to the ED with c/o abdominal pain. Physical exam reveals patient in distress secondary to pain.  Nontoxic.  Exam notable for mild TTP of the epigastric region.  Go robby, rebound or rigidity.  Mucous membranes mildly dry    Differential Diagnosis includes, but is not limited to:  AAA, aortic dissection, mesenteric ischemia, perforated viscous, MI/ACS, SBO/volvulus, incarcerated/strangulated hernia, intussusception, ileus, appendicitis, cholecystitis, cholangitis, diverticulitis, esophagitis, hepatitis, nephrolithiasis, pancreatitis, gastroenteritis, colitis, IBD/IBS, biliary colic, GERD, PUD, constipation, UTI/pyelonephritis,  disorder.      ED management:  EKG with no STEMI or ischemic changes.  Will plan for, IV fluids and analgesics.  Labs grossly unremarkable.  On multiple reassessments reported improvement in symptoms.  Successful p.o. challenge.  Discuss need for establishing with GI and will initiate PPI and Carafate as he reported improvement with this in the past.  Given the resolution in pain with stable vitals and unremarkable labs did not feel he needed emergent imaging    Impression/Plan: Patient informed of diagnosis The primary encounter diagnosis was Gastritis, presence of bleeding unspecified, unspecified chronicity, unspecified gastritis  type. A diagnosis of Epigastric abdominal pain was also pertinent to this visit.  Patient cautioned on when to return to ED.  Pt. Understands and agrees with current treatment plan      Results for orders placed or performed during the hospital encounter of 05/22/25   Comp. Metabolic Panel    Collection Time: 05/22/25  1:00 PM   Result Value Ref Range    Sodium 137 136 - 145 mmol/L    Potassium 3.9 3.5 - 5.1 mmol/L    Chloride 105 95 - 110 mmol/L    CO2 23 23 - 29 mmol/L    Glucose 117 (H) 70 - 110 mg/dL    BUN 16 6 - 20 mg/dL    Creatinine 1.0 0.5 - 1.4 mg/dL    Calcium 9.4 8.7 - 10.5 mg/dL    Protein Total 8.2 6.0 - 8.4 gm/dL    Albumin 4.4 3.5 - 5.2 g/dL    Bilirubin Total 0.4 0.1 - 1.0 mg/dL    ALP 64 40 - 150 unit/L    AST 26 11 - 45 unit/L    ALT 31 10 - 44 unit/L    Anion Gap 9 8 - 16 mmol/L    eGFR >60 >60 mL/min/1.73/m2   Lipase    Collection Time: 05/22/25  1:00 PM   Result Value Ref Range    Lipase Level 18 4 - 60 U/L   CBC with Differential    Collection Time: 05/22/25  1:00 PM   Result Value Ref Range    WBC 8.92 3.90 - 12.70 K/uL    RBC 6.04 4.60 - 6.20 M/uL    HGB 16.2 14.0 - 18.0 gm/dL    HCT 48.6 40.0 - 54.0 %    MCV 81 (L) 82 - 98 fL    MCH 26.8 (L) 27.0 - 31.0 pg    MCHC 33.3 32.0 - 36.0 g/dL    RDW 13.1 11.5 - 14.5 %    Platelet Count 232 150 - 450 K/uL    MPV 11.2 9.2 - 12.9 fL    Nucleated RBC 0 <=0 /100 WBC    Neut % 72.5 38 - 73 %    Lymph % 19.2 18 - 48 %    Mono % 6.3 4 - 15 %    Eos % 1.5 <=8 %    Basophil % 0.3 <=1.9 %    Imm Grans % 0.2 0.0 - 0.5 %    Neut # 6.47 1.8 - 7.7 K/uL    Lymph # 1.71 1 - 4.8 K/uL    Mono # 0.56 0.3 - 1 K/uL    Eos # 0.13 <=0.5 K/uL    Baso # 0.03 <=0.2 K/uL    Imm Grans # 0.02 0.00 - 0.04 K/uL   ISTAT CREATININE    Collection Time: 05/22/25  2:16 PM   Result Value Ref Range    POC Creatinine 1.0 0.5 - 1.4 mg/dL    Sample VENOUS    Urinalysis, Reflex to Urine Culture Urine, Clean Catch    Collection Time: 05/22/25  3:35 PM    Specimen: Urine, Clean Catch    Result Value Ref Range    Color, UA Yellow Straw, Maritza, Yellow, Light-Orange    Appearance, UA Clear Clear    pH, UA 8.0 5.0 - 8.0    Spec Grav UA >=1.030 (A) 1.005 - 1.030    Protein, UA 1+ (A) Negative    Glucose, UA Negative Negative    Ketones, UA Negative Negative    Bilirubin, UA Negative Negative    Blood, UA Negative Negative    Nitrites, UA Negative Negative    Urobilinogen, UA Negative <2.0 EU/dL    Leukocyte Esterase, UA Negative Negative   GREY TOP URINE HOLD    Collection Time: 05/22/25  3:35 PM   Result Value Ref Range    Extra Tube Hold for add-ons.    Urinalysis Microscopic    Collection Time: 05/22/25  3:35 PM   Result Value Ref Range    RBC, UA 2 0 - 4 /HPF    WBC, UA 1 0 - 5 /HPF    Bacteria, UA None None, Rare, Occasional /HPF    Squamous Epithelial Cells, UA 1 /HPF    Hyaline Casts, UA 0 0 - 1 /LPF    Microscopic Comment       Imaging Results    None       [facility] MDM  [facility] Module  May 23 2025 8:43 PM [Delia Navarrete]  Data:  - Test/documents/historian: 3+ tests ordered  Problems: Epigastric abdominal pain  Risk: RX: pantoprazole EC tablet + 3 more (Rx drug management)       Diagnostic Impression:    1. Gastritis, presence of bleeding unspecified, unspecified chronicity, unspecified gastritis type    2. Epigastric abdominal pain         ED Disposition Condition    Discharge Stable            ED Prescriptions       Medication Sig Dispense Start Date End Date Auth. Provider    pantoprazole (PROTONIX) 20 MG tablet Take 1 tablet (20 mg total) by mouth once daily. 30 tablet 5/22/2025 5/22/2026 Delia Navarrete PA    sucralfate (CARAFATE) 100 mg/mL suspension Take 10 mLs (1 g total) by mouth 4 (four) times daily. 414 mL 5/22/2025 -- Delia Navarrete PA          Follow-up Information       Follow up With Specialties Details Why Contact Info    Gastroenterology, [facility] - Gastroenterology Schedule an appointment as soon as possible for a visit   [address]  [facility] [address]  [phone]                   [1]   Social History  Tobacco Use    Smoking status: Never    Smokeless tobacco: Never   Substance Use Topics    Alcohol use: No     Comment: social    Drug use: No        Delia Navarrete PA  05/24/25 2102

## 2025-05-22 NOTE — FIRST PROVIDER EVALUATION
"Medical screening examination initiated.  I have conducted a focused provider triage encounter, findings are as follows:    Brief history of present illness:  epigastric pain radiating to his back since last night    Vitals:    05/22/25 1145   BP: (!) 140/83   BP Location: Left arm   Pulse: 81   Resp: 18   Temp: 98.6 °F (37 °C)   TempSrc: Oral   SpO2: 98%   Weight: 108.9 kg (240 lb)   Height: 5' 10" (1.778 m)       Pertinent physical exam:  uncomfortable appearing    Brief workup plan:  labs    Preliminary workup initiated; this workup will be continued and followed by the physician or advanced practice provider that is assigned to the patient when roomed.  "